# Patient Record
Sex: MALE | Race: OTHER | NOT HISPANIC OR LATINO | URBAN - METROPOLITAN AREA
[De-identification: names, ages, dates, MRNs, and addresses within clinical notes are randomized per-mention and may not be internally consistent; named-entity substitution may affect disease eponyms.]

---

## 2021-10-29 ENCOUNTER — EMERGENCY (EMERGENCY)
Facility: HOSPITAL | Age: 66
LOS: 1 days | Discharge: HOME | End: 2021-10-29
Attending: EMERGENCY MEDICINE | Admitting: EMERGENCY MEDICINE
Payer: COMMERCIAL

## 2021-10-29 VITALS
TEMPERATURE: 98 F | DIASTOLIC BLOOD PRESSURE: 90 MMHG | OXYGEN SATURATION: 98 % | SYSTOLIC BLOOD PRESSURE: 174 MMHG | WEIGHT: 177.03 LBS | HEART RATE: 71 BPM | HEIGHT: 66 IN | RESPIRATION RATE: 18 BRPM

## 2021-10-29 PROCEDURE — 93010 ELECTROCARDIOGRAM REPORT: CPT

## 2021-10-29 PROCEDURE — 99285 EMERGENCY DEPT VISIT HI MDM: CPT

## 2021-10-29 NOTE — ED ADULT TRIAGE NOTE - DOMESTIC TRAVEL HIGH RISK QUESTION
Component Results     Component Value Ref Range & Units Status Collected Lab   pH, Arterial 7.370  7.350 - 7.450 Final 07/05/2018 11:51 PM [address] [place] [address] Lab   pCO2, Arterial 32.0   35.0 - 45.0 mmHg Final 07/05/2018 11:51 PM [facility] Lab   pO2, Arterial 44.0   80.0 - 100.0 mmHg Final 07/05/2018 11:51 PM [facility] Lab   HCO3, Arterial 18.5   22.0 - 26.0 mmol/L Final 07/05/2018 11:51 PM [facility] Excess, Arterial -6.1   -2.0 - 2.0 mmol/L Final 07/05/2018 11:51 PM [address] [place] [address] Lab   Hemoglobin, Art, Extended 8.5   12.0 - 16.0 g/dL Final 07/05/2018 11:51 PM [address] [place] [address] Lab   O2 Sat, Arterial 86.6   >92 % Final 07/05/2018 11:51 PM [facility] Lab   Carboxyhgb, Arterial 2.2  0.0 - 5.0 % Final 07/05/2018 11:51 PM [facility] Lab        0.0-1.5   (Smokers 1.5-5.0)    Methemoglobin, Arterial 1.4  <1.5 % Final 07/05/2018 11:51 PM [address] [place] [address] Lab   O2 Content, Arterial 10.4  Not Established mL/dL Final 07/05/2018 11:51 PM [address] [place] [address] Lab   O2 Therapy          Prvc,18,500,100,10peep  rei No

## 2021-10-30 VITALS
HEART RATE: 59 BPM | OXYGEN SATURATION: 99 % | TEMPERATURE: 98 F | RESPIRATION RATE: 16 BRPM | DIASTOLIC BLOOD PRESSURE: 90 MMHG | SYSTOLIC BLOOD PRESSURE: 174 MMHG

## 2021-10-30 LAB
ALBUMIN SERPL ELPH-MCNC: 4.4 G/DL — SIGNIFICANT CHANGE UP (ref 3.3–5)
ALP SERPL-CCNC: 72 U/L — SIGNIFICANT CHANGE UP (ref 40–120)
ALT FLD-CCNC: 27 U/L — SIGNIFICANT CHANGE UP (ref 10–45)
ANION GAP SERPL CALC-SCNC: 13 MMOL/L — SIGNIFICANT CHANGE UP (ref 5–17)
APPEARANCE UR: CLEAR — SIGNIFICANT CHANGE UP
AST SERPL-CCNC: 32 U/L — SIGNIFICANT CHANGE UP (ref 10–40)
BASE EXCESS BLDV CALC-SCNC: 1.2 MMOL/L — SIGNIFICANT CHANGE UP (ref -2–2)
BASOPHILS # BLD AUTO: 0.06 K/UL — SIGNIFICANT CHANGE UP (ref 0–0.2)
BASOPHILS NFR BLD AUTO: 0.8 % — SIGNIFICANT CHANGE UP (ref 0–2)
BILIRUB SERPL-MCNC: 0.5 MG/DL — SIGNIFICANT CHANGE UP (ref 0.2–1.2)
BILIRUB UR-MCNC: NEGATIVE — SIGNIFICANT CHANGE UP
BUN SERPL-MCNC: 16 MG/DL — SIGNIFICANT CHANGE UP (ref 7–23)
CA-I SERPL-SCNC: 1.26 MMOL/L — SIGNIFICANT CHANGE UP (ref 1.15–1.33)
CALCIUM SERPL-MCNC: 9.6 MG/DL — SIGNIFICANT CHANGE UP (ref 8.4–10.5)
CHLORIDE BLDV-SCNC: 100 MMOL/L — SIGNIFICANT CHANGE UP (ref 96–108)
CHLORIDE SERPL-SCNC: 99 MMOL/L — SIGNIFICANT CHANGE UP (ref 96–108)
CO2 BLDV-SCNC: 28 MMOL/L — HIGH (ref 22–26)
CO2 SERPL-SCNC: 23 MMOL/L — SIGNIFICANT CHANGE UP (ref 22–31)
COLOR SPEC: COLORLESS — SIGNIFICANT CHANGE UP
CREAT SERPL-MCNC: 0.72 MG/DL — SIGNIFICANT CHANGE UP (ref 0.5–1.3)
DIFF PNL FLD: NEGATIVE — SIGNIFICANT CHANGE UP
EOSINOPHIL # BLD AUTO: 0.54 K/UL — HIGH (ref 0–0.5)
EOSINOPHIL NFR BLD AUTO: 7.1 % — HIGH (ref 0–6)
GAS PNL BLDV: 132 MMOL/L — LOW (ref 136–145)
GAS PNL BLDV: SIGNIFICANT CHANGE UP
GAS PNL BLDV: SIGNIFICANT CHANGE UP
GLUCOSE BLDV-MCNC: 103 MG/DL — HIGH (ref 70–99)
GLUCOSE SERPL-MCNC: 110 MG/DL — HIGH (ref 70–99)
GLUCOSE UR QL: NEGATIVE — SIGNIFICANT CHANGE UP
HCO3 BLDV-SCNC: 27 MMOL/L — SIGNIFICANT CHANGE UP (ref 22–29)
HCT VFR BLD CALC: 44.9 % — SIGNIFICANT CHANGE UP (ref 39–50)
HCT VFR BLDA CALC: 48 % — SIGNIFICANT CHANGE UP (ref 39–51)
HGB BLD CALC-MCNC: 15.9 G/DL — SIGNIFICANT CHANGE UP (ref 12.6–17.4)
HGB BLD-MCNC: 15.8 G/DL — SIGNIFICANT CHANGE UP (ref 13–17)
IMM GRANULOCYTES NFR BLD AUTO: 0.4 % — SIGNIFICANT CHANGE UP (ref 0–1.5)
KETONES UR-MCNC: NEGATIVE — SIGNIFICANT CHANGE UP
LACTATE BLDV-MCNC: 0.8 MMOL/L — SIGNIFICANT CHANGE UP (ref 0.7–2)
LEUKOCYTE ESTERASE UR-ACNC: NEGATIVE — SIGNIFICANT CHANGE UP
LIDOCAIN IGE QN: 65 U/L — HIGH (ref 7–60)
LYMPHOCYTES # BLD AUTO: 1.97 K/UL — SIGNIFICANT CHANGE UP (ref 1–3.3)
LYMPHOCYTES # BLD AUTO: 25.9 % — SIGNIFICANT CHANGE UP (ref 13–44)
MAGNESIUM SERPL-MCNC: 2.1 MG/DL — SIGNIFICANT CHANGE UP (ref 1.6–2.6)
MCHC RBC-ENTMCNC: 29.8 PG — SIGNIFICANT CHANGE UP (ref 27–34)
MCHC RBC-ENTMCNC: 35.2 GM/DL — SIGNIFICANT CHANGE UP (ref 32–36)
MCV RBC AUTO: 84.7 FL — SIGNIFICANT CHANGE UP (ref 80–100)
MONOCYTES # BLD AUTO: 0.6 K/UL — SIGNIFICANT CHANGE UP (ref 0–0.9)
MONOCYTES NFR BLD AUTO: 7.9 % — SIGNIFICANT CHANGE UP (ref 2–14)
NEUTROPHILS # BLD AUTO: 4.41 K/UL — SIGNIFICANT CHANGE UP (ref 1.8–7.4)
NEUTROPHILS NFR BLD AUTO: 57.9 % — SIGNIFICANT CHANGE UP (ref 43–77)
NITRITE UR-MCNC: NEGATIVE — SIGNIFICANT CHANGE UP
NRBC # BLD: 0 /100 WBCS — SIGNIFICANT CHANGE UP (ref 0–0)
PCO2 BLDV: 44 MMHG — SIGNIFICANT CHANGE UP (ref 42–55)
PH BLDV: 7.39 — SIGNIFICANT CHANGE UP (ref 7.32–7.43)
PH UR: 6 — SIGNIFICANT CHANGE UP (ref 5–8)
PLATELET # BLD AUTO: 240 K/UL — SIGNIFICANT CHANGE UP (ref 150–400)
PO2 BLDV: 68 MMHG — HIGH (ref 25–45)
POTASSIUM BLDV-SCNC: 3.3 MMOL/L — LOW (ref 3.5–5.1)
POTASSIUM SERPL-MCNC: 3.2 MMOL/L — LOW (ref 3.5–5.3)
POTASSIUM SERPL-SCNC: 3.2 MMOL/L — LOW (ref 3.5–5.3)
PROT SERPL-MCNC: 7.2 G/DL — SIGNIFICANT CHANGE UP (ref 6–8.3)
PROT UR-MCNC: NEGATIVE — SIGNIFICANT CHANGE UP
RAPID RVP RESULT: SIGNIFICANT CHANGE UP
RBC # BLD: 5.3 M/UL — SIGNIFICANT CHANGE UP (ref 4.2–5.8)
RBC # FLD: 12.5 % — SIGNIFICANT CHANGE UP (ref 10.3–14.5)
SAO2 % BLDV: 93.8 % — HIGH (ref 67–88)
SARS-COV-2 RNA SPEC QL NAA+PROBE: SIGNIFICANT CHANGE UP
SODIUM SERPL-SCNC: 135 MMOL/L — SIGNIFICANT CHANGE UP (ref 135–145)
SP GR SPEC: 1.01 — LOW (ref 1.01–1.02)
TROPONIN T, HIGH SENSITIVITY RESULT: 17 NG/L — SIGNIFICANT CHANGE UP (ref 0–51)
TROPONIN T, HIGH SENSITIVITY RESULT: 18 NG/L — SIGNIFICANT CHANGE UP (ref 0–51)
UROBILINOGEN FLD QL: NEGATIVE — SIGNIFICANT CHANGE UP
WBC # BLD: 7.61 K/UL — SIGNIFICANT CHANGE UP (ref 3.8–10.5)
WBC # FLD AUTO: 7.61 K/UL — SIGNIFICANT CHANGE UP (ref 3.8–10.5)

## 2021-10-30 PROCEDURE — 84295 ASSAY OF SERUM SODIUM: CPT

## 2021-10-30 PROCEDURE — 71046 X-RAY EXAM CHEST 2 VIEWS: CPT

## 2021-10-30 PROCEDURE — 85025 COMPLETE CBC W/AUTO DIFF WBC: CPT

## 2021-10-30 PROCEDURE — 84132 ASSAY OF SERUM POTASSIUM: CPT

## 2021-10-30 PROCEDURE — 99284 EMERGENCY DEPT VISIT MOD MDM: CPT | Mod: 25

## 2021-10-30 PROCEDURE — 93005 ELECTROCARDIOGRAM TRACING: CPT

## 2021-10-30 PROCEDURE — 85018 HEMOGLOBIN: CPT

## 2021-10-30 PROCEDURE — 82565 ASSAY OF CREATININE: CPT

## 2021-10-30 PROCEDURE — 85014 HEMATOCRIT: CPT

## 2021-10-30 PROCEDURE — 87086 URINE CULTURE/COLONY COUNT: CPT

## 2021-10-30 PROCEDURE — 83690 ASSAY OF LIPASE: CPT

## 2021-10-30 PROCEDURE — 71046 X-RAY EXAM CHEST 2 VIEWS: CPT | Mod: 26

## 2021-10-30 PROCEDURE — 80053 COMPREHEN METABOLIC PANEL: CPT

## 2021-10-30 PROCEDURE — 73502 X-RAY EXAM HIP UNI 2-3 VIEWS: CPT

## 2021-10-30 PROCEDURE — 81003 URINALYSIS AUTO W/O SCOPE: CPT

## 2021-10-30 PROCEDURE — 84484 ASSAY OF TROPONIN QUANT: CPT

## 2021-10-30 PROCEDURE — 82947 ASSAY GLUCOSE BLOOD QUANT: CPT

## 2021-10-30 PROCEDURE — 73502 X-RAY EXAM HIP UNI 2-3 VIEWS: CPT | Mod: 26,RT

## 2021-10-30 PROCEDURE — 83605 ASSAY OF LACTIC ACID: CPT

## 2021-10-30 PROCEDURE — 83735 ASSAY OF MAGNESIUM: CPT

## 2021-10-30 PROCEDURE — 82330 ASSAY OF CALCIUM: CPT

## 2021-10-30 PROCEDURE — 0225U NFCT DS DNA&RNA 21 SARSCOV2: CPT

## 2021-10-30 PROCEDURE — 36415 COLL VENOUS BLD VENIPUNCTURE: CPT

## 2021-10-30 PROCEDURE — 82803 BLOOD GASES ANY COMBINATION: CPT

## 2021-10-30 PROCEDURE — 82435 ASSAY OF BLOOD CHLORIDE: CPT

## 2021-10-30 PROCEDURE — 96374 THER/PROPH/DIAG INJ IV PUSH: CPT

## 2021-10-30 RX ORDER — FAMOTIDINE 10 MG/ML
20 INJECTION INTRAVENOUS ONCE
Refills: 0 | Status: COMPLETED | OUTPATIENT
Start: 2021-10-30 | End: 2021-10-30

## 2021-10-30 RX ORDER — POTASSIUM CHLORIDE 20 MEQ
40 PACKET (EA) ORAL ONCE
Refills: 0 | Status: COMPLETED | OUTPATIENT
Start: 2021-10-30 | End: 2021-10-30

## 2021-10-30 RX ORDER — FAMOTIDINE 10 MG/ML
1 INJECTION INTRAVENOUS
Qty: 14 | Refills: 0
Start: 2021-10-30 | End: 2021-11-12

## 2021-10-30 RX ADMIN — Medication 40 MILLIEQUIVALENT(S): at 04:50

## 2021-10-30 RX ADMIN — FAMOTIDINE 20 MILLIGRAM(S): 10 INJECTION INTRAVENOUS at 02:51

## 2021-10-30 NOTE — ED PROVIDER NOTE - ATTENDING CONTRIBUTION TO CARE
Attending Statement (JOY Espino MD):    HPI: 65y/o M c/o left sided chest pain for approximately 2 hours prior to arrival to ED.  States that this occurred after eating; indicates the left upper abdomen as site of pain. Denies shortness of breath, denies radiation of pain, no fever, no cough, no other sites of pain (denies abdominal pain), no nausea/vomiting. No diarrhea, no black/blood stools.    Review of Systems:  -General: no fever or chills  -ENT: no congestion, no difficulty swallowing  -Pulmonary: no cough, no shortness of breath  -Cardiac: +chest pain (see hpi), no palpitations  -Gastrointestinal: no abdominal pain, no nausea, no vomiting, and no diarrhea.  -Genitourinary: no blood or pain with urination  -Musculoskeletal: no back or neck pain  -Skin: no rashes  -Endocrine: No h/o diabetes   -Neurologic: No new weakness or numbness in extremities    All else negative unless otherwise specified elsewhere in this note.    PSH/PMH as noted above    On Physical Exam:  General: well appearing, in NAD, speaking clearly in full sentences and without difficulty; cooperative with exam  HEENT: anicteric, airway patent  Neck: no JVD  Cardiac: normal s1, s2; RRR; no MGR  Lungs: CTABL  Abdomen: soft nontender/nondistended  : no bladder tenderness or distension  Skin: intact, no rash  Extremities: no peripheral edema, no gross deformities    ECG NSR @ 65bpm, diffuse flattening in the lateral leads (v5-6 and I, II, II, aVR, aVL, aVF); no prior for comparison; qtc 418    MDM: Episode of chest pain (but localizing to LUQ and in setting / relation to food) suggestive more of gastritis/GI etiology than cardiac. ECG with nonspecific ST abnormalities but no evidence of acute infarct.  Patient's description of chest pain, including lack of pleuritic nature, minimal risk factors and overall clinical picture are not consistent with a pulmonary embolism. The patient's clinical presentation, including type/description of pain, stable vitals and overall clinical picture are not consistent with an acute aortic dissection. No fever, cough or abdominal pain/tenderness to suggest acute infectious etiology.  Will check labs: cbc (to evaluate for leukocytosis or anemia), CMP (to evaluate for electrolyte abnormalities or renal/liver dysfunction), lipase (to evaluate for pancreatitis), and troponin (eval for NSTEMI). Screening CXR ordered.  If no acute actionable findings on labs/imaging, can consider treatment as GI source, but also recommend close interval cardiology follow-up to further exclude CAD.      Please see above progress notes above for updates to medical decision making and the patient's clinical course.

## 2021-10-30 NOTE — ED PROVIDER NOTE - OBJECTIVE STATEMENT
67 yo hx of HLD, HTN, presenting for c/o LUQ abd pain which began after eating lunch at 2 pm today. Described as cramping and non radi ting. He has not had this pain before. In ED pt states that his pain has improved significantly. States his pain improved after BM at 6 PM. Denies CP, SOB, nausea, vomiting, changes in BM's, melena, diarrhea, changes in urination, and swelling in legs. Denies lightheadedness and palpitations. Denies fevers, cough, and congestion. Took tylenol earlier today for his symptoms. Denies hx of CAD, smoking, regular alcohol, or significant family hx of cardiac disease. Unrelated to chief complaint, pt reports 2 wks atraumatic right thigh/hip pain. Denies LE numbness or weakness. Denies recent trauma.

## 2021-10-30 NOTE — ED PROVIDER NOTE - PHYSICAL EXAMINATION
Gen: A&Ox4   HEENT: Atraumatic. Mucous membranes moist, no scleral icterus.  CV: RRR. No significant lower extremity edema.   Resp: Respirations unlabored. CTAB, no rales, no wheezes.  GI: Abdomen minimally ttp in LUQ, otherwise non tender to palpation, soft and non-distended. Also ttp over proximal right anterior thigh w/o overlying skin changes, no restriction in active ROM.   Skin/MSK: No open wounds. No ecchymosis appreciated.  Neuro: Following commands. No facial drop. Motor strength +5/5 throughout upper and lower extremities. Sensation intact throughout upper and lower extremities.   Psych: Appropriate mood, cooperative

## 2021-10-30 NOTE — ED PROVIDER NOTE - NS ED ROS FT
Gen: Denies fever.   HEENT: Denies headache. Denies congestion.  CV: Denies chest pain. Denies lightheadedness.  Skin: Denies rash.   Resp: Denies SOB. Denies cough.  GI: +abd pain. Denies nausea. Denies vomiting. Denies diarrhea. Denies melena. Denies hematochezia.  Msk: Denies extremity swelling. +extremity pain.  : Denies dysuria. Denies hematuria.  Neuro: Denies LOC. Denies dizziness. Denies new numbness/tingling. Denies new focal weakness.  Psych: Denies SI

## 2021-10-30 NOTE — ED ADULT NURSE REASSESSMENT NOTE - NS ED NURSE REASSESS COMMENT FT1
Pt resting in stretcher, a&ox3, nad, breathing spontaneous and nonlabored, able to move all extremities and follow commands. Pt denies cp at this time, denies additional pain medication at this time. Pending repeat troponin. Pt sinus tatiana on CM

## 2021-10-30 NOTE — ED PROVIDER NOTE - NSPTACCESSSVCSAPPTDETAILS_ED_ALL_ED_FT
Please call to arrange follow up with cardiology within 3-5 days after visit in ED Please call to arrange follow up with cardiology within 3-5 days after visit in ED. Please call son, Leif Wynn, at [phone] to arrange this appointment.

## 2021-10-30 NOTE — ED PROVIDER NOTE - NSFOLLOWUPINSTRUCTIONS_ED_ALL_ED_FT
1. You presented to the emergency department for:  abdominal pain    2. Your evaluation in the emergency department included a physician evaluation and testing consisting of: lab work, ecg, and xrays. Your work-up did not reveal any findings indicating the need for admission to the hospital or any emergent interventions at this time.     3. It is recommended that you follow-up with a cardiologist within 3-5 days and your primary care doctor within 1-2 weeks as discussed for repeat evaluations, and potentially further testing and treatment.     If needed, to arrange an appointment with a primary care provider please call: [phone]    4. Please continue taking your regular medications as prescribed.     In the event that your abdominal pain is due to a peptic ulcer or gastritis, a prescription for famotadine is available for you to  at your pharmacy to trial until you follow up with your doctor. Please read and adhere to the instructions for use available on the packaging. Additionally, please read the warnings on the packaging before use. If you have any questions regarding your prescription, you may refer them to the pharmacist.    For pain you may take 500-1000mg Tylenol every 8 hours - as needed.  This is an over-the-counter medication - please read the instructions for use and warnings on the label. If you have any questions regarding its use, you may refer them to your local pharmacist.    5. PLEASE RETURN TO THE EMERGENCY DEPARTMENT IMMEDIATELY IF you develop any fevers not responding to over the counter medications, uncontrollable nausea and vomiting, an inability to tolerate eating and drinking, difficulty breathing, chest pain, a severe increase in your symptoms or pain, or any other new symptoms that concern you. 1. You presented to the emergency department for:  abdominal pain    2. Your evaluation in the emergency department included a physician evaluation and testing consisting of: lab work, ecg, and xrays. Your work-up did not reveal any findings indicating the need for admission to the hospital or any emergent interventions at this time.     3. It is recommended that you follow-up with a cardiologist within 3-5 days, and your primary care doctor within 1-2 weeks, as discussed for repeat evaluations, and potentially further testing and treatment.     If needed, to arrange an appointment with a primary care provider please call: [phone]    4. Please continue taking your regular medications as prescribed.     In the event that your abdominal pain is due to a peptic ulcer or gastritis, a prescription for famotidine is available for you to  at your pharmacy to trial until you follow up with your doctor. Please read and adhere to the instructions for use available on the packaging. Additionally, please read the warnings on the packaging before use. If you have any questions regarding your prescription, you may refer them to the pharmacist.    For pain you may take 500-1000mg Tylenol every 8 hours - as needed.  This is an over-the-counter medication - please read the instructions for use and warnings on the label. If you have any questions regarding its use, you may refer them to your local pharmacist.    5. PLEASE RETURN TO THE EMERGENCY DEPARTMENT IMMEDIATELY IF you develop any fevers not responding to over the counter medications, uncontrollable nausea and vomiting, an inability to tolerate eating and drinking, difficulty breathing, chest pain, a severe increase in your symptoms or pain, or any other new symptoms that concern you.

## 2021-10-30 NOTE — ED PROVIDER NOTE - NSFOLLOWUPCLINICS_GEN_ALL_ED_FT
[facility]  Cardiology  [address] [place] [id number]  Phone: [phone]  Fax:

## 2021-10-30 NOTE — ED ADULT NURSE NOTE - OBJECTIVE STATEMENT
65 yo male presents to ED c/o L sided CP x 2 hours PTA. Pt reports the pain started after eating. Pt denies sob, palpitations, radiating pain, h/a, dizziness, weakness, numbness/tingling, abdominal pain, n/v/d, urinary symptoms, fevers, chills, hematuria, blood in stool. Upon assessment, pt a&ox3, nad, breathing spontaneous and nonlabored, able to move all extremities and follow commands, skin warm and appropriate color. Pt sinus tatiana on CM. MD at bedside. Pt safety and comfort provided. 65 yo male with pmh HTN presents to ED c/o L sided CP x 2 hours PTA. Pt reports the pain started after eating. Pt denies sob, palpitations, radiating pain, h/a, dizziness, weakness, numbness/tingling, abdominal pain, n/v/d, urinary symptoms, fevers, chills, hematuria, blood in stool. Upon assessment, pt a&ox3, nad, breathing spontaneous and nonlabored, able to move all extremities and follow commands, skin warm and appropriate color. Pt sinus tatiana on CM. MD at bedside. Pt safety and comfort provided. 65 yo male with pmh HTN presents to ED c/o L sided CP x 2 hours PTA. Pt reports the pain started after eating "kyle and rice and beans." Pt denies sob, palpitations, radiating pain, h/a, dizziness, weakness, numbness/tingling, abdominal pain, n/v/d, urinary symptoms, fevers, chills, hematuria, blood in stool. Upon assessment, pt a&ox3, nad, breathing spontaneous and nonlabored, able to move all extremities and follow commands, skin warm and appropriate color. Pt sinus tatiana on CM. MD at bedside. Pt safety and comfort provided.

## 2021-10-30 NOTE — ED PROVIDER NOTE - ST/T WAVE
diffuse flattening in the lateral leads (v5-6 and I, II, II, aVR, aVL, aVF); no prior for comparison; qtc 418

## 2021-10-30 NOTE — ED PROVIDER NOTE - CLINICAL SUMMARY MEDICAL DECISION MAKING FREE TEXT BOX
67 yo hx of HLD, HTN, presenting for c/o LUQ abd pain which began after eating lunch at 2 pm today. No infectious symptoms. No CP/SOB or melena. Cardiac risk factors HLD, HTN, age. Improvement in pain after BM prior to ED arrival. Minimal concern for cardiac ischemia but will trop r/o given chief complaint from triage. Likely 2/2 to constipation given hx, low concern for diverticulitis, pancreatitis. Will assess lab work, ecg, cxr. Famotidine. XR for right hip pain. Will reassess.

## 2021-10-30 NOTE — ED PROVIDER NOTE - PATIENT PORTAL LINK FT
You can access the FollowMyHealth Patient Portal offered by [facility] by registering at the following website: [email or web address]. By joining [employer]’s FollowMyHealth portal, you will also be able to view your health information using other applications (apps) compatible with our system.

## 2021-10-30 NOTE — ED PROVIDER NOTE - PROGRESS NOTE DETAILS
Arron Valenzuela PGY1: Results thus far reviewed, and there are no findings suggesting need for hospitalization or further emergent treatment. Repeat trop stable. Results discussed with pt. Pt states that their symptoms have improved, and they are comfortable with returning home with cardiology follow-up. Also instructed to f/u with internal medicine for ongoing evaluation of LUQ pain. Pt understands plan, and has been provided with return precautions, and understands reasons to return to the ER. Son at bedside and in agreement as well.

## 2021-10-31 LAB
CULTURE RESULTS: NO GROWTH — SIGNIFICANT CHANGE UP
SPECIMEN SOURCE: SIGNIFICANT CHANGE UP

## 2021-11-03 DIAGNOSIS — R10.9 UNSPECIFIED ABDOMINAL PAIN: ICD-10-CM

## 2021-11-03 DIAGNOSIS — I10 ESSENTIAL (PRIMARY) HYPERTENSION: ICD-10-CM

## 2021-11-04 ENCOUNTER — APPOINTMENT (OUTPATIENT)
Dept: CARDIOLOGY | Facility: CLINIC | Age: 66
End: 2021-11-04

## 2022-06-30 ENCOUNTER — INPATIENT (INPATIENT)
Facility: HOSPITAL | Age: 67
LOS: 1 days | Discharge: HOME | DRG: 370 | End: 2022-07-02
Attending: STUDENT IN AN ORGANIZED HEALTH CARE EDUCATION/TRAINING PROGRAM | Admitting: STUDENT IN AN ORGANIZED HEALTH CARE EDUCATION/TRAINING PROGRAM
Payer: COMMERCIAL

## 2022-06-30 VITALS
HEIGHT: 66 IN | OXYGEN SATURATION: 96 % | HEART RATE: 74 BPM | SYSTOLIC BLOOD PRESSURE: 152 MMHG | TEMPERATURE: 98 F | WEIGHT: 169.98 LBS | DIASTOLIC BLOOD PRESSURE: 82 MMHG | RESPIRATION RATE: 18 BRPM

## 2022-06-30 DIAGNOSIS — R07.9 CHEST PAIN, UNSPECIFIED: ICD-10-CM

## 2022-06-30 DIAGNOSIS — Z29.9 ENCOUNTER FOR PROPHYLACTIC MEASURES, UNSPECIFIED: ICD-10-CM

## 2022-06-30 DIAGNOSIS — K92.2 GASTROINTESTINAL HEMORRHAGE, UNSPECIFIED: ICD-10-CM

## 2022-06-30 DIAGNOSIS — I10 ESSENTIAL (PRIMARY) HYPERTENSION: ICD-10-CM

## 2022-06-30 LAB
ALBUMIN SERPL ELPH-MCNC: 4.3 G/DL — SIGNIFICANT CHANGE UP (ref 3.3–5)
ALP SERPL-CCNC: 62 U/L — SIGNIFICANT CHANGE UP (ref 40–120)
ALT FLD-CCNC: 21 U/L — SIGNIFICANT CHANGE UP (ref 10–45)
ANION GAP SERPL CALC-SCNC: 11 MMOL/L — SIGNIFICANT CHANGE UP (ref 5–17)
APTT BLD: 32.2 SEC — SIGNIFICANT CHANGE UP (ref 27.5–35.5)
AST SERPL-CCNC: 27 U/L — SIGNIFICANT CHANGE UP (ref 10–40)
BASE EXCESS BLDV CALC-SCNC: 3.4 MMOL/L — HIGH (ref -2–2)
BASOPHILS # BLD AUTO: 0.05 K/UL — SIGNIFICANT CHANGE UP (ref 0–0.2)
BASOPHILS NFR BLD AUTO: 0.6 % — SIGNIFICANT CHANGE UP (ref 0–2)
BILIRUB SERPL-MCNC: 0.4 MG/DL — SIGNIFICANT CHANGE UP (ref 0.2–1.2)
BLD GP AB SCN SERPL QL: NEGATIVE — SIGNIFICANT CHANGE UP
BUN SERPL-MCNC: 17 MG/DL — SIGNIFICANT CHANGE UP (ref 7–23)
CA-I SERPL-SCNC: 1.25 MMOL/L — SIGNIFICANT CHANGE UP (ref 1.15–1.33)
CALCIUM SERPL-MCNC: 9.5 MG/DL — SIGNIFICANT CHANGE UP (ref 8.4–10.5)
CHLORIDE BLDV-SCNC: 101 MMOL/L — SIGNIFICANT CHANGE UP (ref 96–108)
CHLORIDE SERPL-SCNC: 101 MMOL/L — SIGNIFICANT CHANGE UP (ref 96–108)
CO2 BLDV-SCNC: 30 MMOL/L — HIGH (ref 22–26)
CO2 SERPL-SCNC: 24 MMOL/L — SIGNIFICANT CHANGE UP (ref 22–31)
CREAT SERPL-MCNC: 0.94 MG/DL — SIGNIFICANT CHANGE UP (ref 0.5–1.3)
EGFR: 89 ML/MIN/1.73M2 — SIGNIFICANT CHANGE UP
EOSINOPHIL # BLD AUTO: 0.57 K/UL — HIGH (ref 0–0.5)
EOSINOPHIL NFR BLD AUTO: 7.1 % — HIGH (ref 0–6)
GAS PNL BLDV: 134 MMOL/L — LOW (ref 136–145)
GAS PNL BLDV: SIGNIFICANT CHANGE UP
GAS PNL BLDV: SIGNIFICANT CHANGE UP
GLUCOSE BLDV-MCNC: 92 MG/DL — SIGNIFICANT CHANGE UP (ref 70–99)
GLUCOSE SERPL-MCNC: 98 MG/DL — SIGNIFICANT CHANGE UP (ref 70–99)
HCO3 BLDV-SCNC: 29 MMOL/L — SIGNIFICANT CHANGE UP (ref 22–29)
HCT VFR BLD CALC: 44.2 % — SIGNIFICANT CHANGE UP (ref 39–50)
HCT VFR BLDA CALC: 45 % — SIGNIFICANT CHANGE UP (ref 39–51)
HGB BLD CALC-MCNC: 15 G/DL — SIGNIFICANT CHANGE UP (ref 12.6–17.4)
HGB BLD-MCNC: 14.8 G/DL — SIGNIFICANT CHANGE UP (ref 13–17)
IMM GRANULOCYTES NFR BLD AUTO: 0.3 % — SIGNIFICANT CHANGE UP (ref 0–1.5)
INR BLD: 1.14 RATIO — SIGNIFICANT CHANGE UP (ref 0.88–1.16)
LACTATE BLDV-MCNC: 0.7 MMOL/L — SIGNIFICANT CHANGE UP (ref 0.7–2)
LIDOCAIN IGE QN: 29 U/L — SIGNIFICANT CHANGE UP (ref 7–60)
LYMPHOCYTES # BLD AUTO: 1.78 K/UL — SIGNIFICANT CHANGE UP (ref 1–3.3)
LYMPHOCYTES # BLD AUTO: 22.3 % — SIGNIFICANT CHANGE UP (ref 13–44)
MCHC RBC-ENTMCNC: 29.2 PG — SIGNIFICANT CHANGE UP (ref 27–34)
MCHC RBC-ENTMCNC: 33.5 GM/DL — SIGNIFICANT CHANGE UP (ref 32–36)
MCV RBC AUTO: 87.4 FL — SIGNIFICANT CHANGE UP (ref 80–100)
MONOCYTES # BLD AUTO: 0.76 K/UL — SIGNIFICANT CHANGE UP (ref 0–0.9)
MONOCYTES NFR BLD AUTO: 9.5 % — SIGNIFICANT CHANGE UP (ref 2–14)
NEUTROPHILS # BLD AUTO: 4.82 K/UL — SIGNIFICANT CHANGE UP (ref 1.8–7.4)
NEUTROPHILS NFR BLD AUTO: 60.2 % — SIGNIFICANT CHANGE UP (ref 43–77)
NRBC # BLD: 0 /100 WBCS — SIGNIFICANT CHANGE UP (ref 0–0)
OB PNL STL: NEGATIVE — SIGNIFICANT CHANGE UP
PCO2 BLDV: 47 MMHG — SIGNIFICANT CHANGE UP (ref 42–55)
PH BLDV: 7.4 — SIGNIFICANT CHANGE UP (ref 7.32–7.43)
PLATELET # BLD AUTO: 229 K/UL — SIGNIFICANT CHANGE UP (ref 150–400)
PO2 BLDV: 47 MMHG — HIGH (ref 25–45)
POTASSIUM BLDV-SCNC: 3.5 MMOL/L — SIGNIFICANT CHANGE UP (ref 3.5–5.1)
POTASSIUM SERPL-MCNC: 3.7 MMOL/L — SIGNIFICANT CHANGE UP (ref 3.5–5.3)
POTASSIUM SERPL-SCNC: 3.7 MMOL/L — SIGNIFICANT CHANGE UP (ref 3.5–5.3)
PROT SERPL-MCNC: 7.4 G/DL — SIGNIFICANT CHANGE UP (ref 6–8.3)
PROTHROM AB SERPL-ACNC: 13.1 SEC — SIGNIFICANT CHANGE UP (ref 10.5–13.4)
RBC # BLD: 5.06 M/UL — SIGNIFICANT CHANGE UP (ref 4.2–5.8)
RBC # FLD: 12.1 % — SIGNIFICANT CHANGE UP (ref 10.3–14.5)
RH IG SCN BLD-IMP: POSITIVE — SIGNIFICANT CHANGE UP
RH IG SCN BLD-IMP: POSITIVE — SIGNIFICANT CHANGE UP
SAO2 % BLDV: 74.5 % — SIGNIFICANT CHANGE UP (ref 67–88)
SARS-COV-2 RNA SPEC QL NAA+PROBE: SIGNIFICANT CHANGE UP
SODIUM SERPL-SCNC: 136 MMOL/L — SIGNIFICANT CHANGE UP (ref 135–145)
TROPONIN T, HIGH SENSITIVITY RESULT: 15 NG/L — SIGNIFICANT CHANGE UP (ref 0–51)
TROPONIN T, HIGH SENSITIVITY RESULT: 18 NG/L — SIGNIFICANT CHANGE UP (ref 0–51)
WBC # BLD: 8 K/UL — SIGNIFICANT CHANGE UP (ref 3.8–10.5)
WBC # FLD AUTO: 8 K/UL — SIGNIFICANT CHANGE UP (ref 3.8–10.5)

## 2022-06-30 PROCEDURE — 71046 X-RAY EXAM CHEST 2 VIEWS: CPT | Mod: 26

## 2022-06-30 PROCEDURE — 93010 ELECTROCARDIOGRAM REPORT: CPT

## 2022-06-30 PROCEDURE — 99285 EMERGENCY DEPT VISIT HI MDM: CPT

## 2022-06-30 PROCEDURE — 99223 1ST HOSP IP/OBS HIGH 75: CPT

## 2022-06-30 RX ORDER — LOSARTAN POTASSIUM 100 MG/1
1 TABLET, FILM COATED ORAL
Qty: 0 | Refills: 0 | DISCHARGE

## 2022-06-30 RX ORDER — ASPIRIN/CALCIUM CARB/MAGNESIUM 324 MG
1 TABLET ORAL
Qty: 0 | Refills: 0 | DISCHARGE

## 2022-06-30 RX ORDER — PANTOPRAZOLE SODIUM 20 MG/1
8 TABLET, DELAYED RELEASE ORAL
Qty: 80 | Refills: 0 | Status: DISCONTINUED | OUTPATIENT
Start: 2022-06-30 | End: 2022-07-01

## 2022-06-30 RX ORDER — ACETAMINOPHEN 500 MG
975 TABLET ORAL ONCE
Refills: 0 | Status: COMPLETED | OUTPATIENT
Start: 2022-06-30 | End: 2022-06-30

## 2022-06-30 RX ORDER — SIMVASTATIN 20 MG/1
20 TABLET, FILM COATED ORAL AT BEDTIME
Refills: 0 | Status: DISCONTINUED | OUTPATIENT
Start: 2022-06-30 | End: 2022-06-30

## 2022-06-30 RX ORDER — ACETAMINOPHEN 500 MG
650 TABLET ORAL EVERY 6 HOURS
Refills: 0 | Status: DISCONTINUED | OUTPATIENT
Start: 2022-06-30 | End: 2022-07-02

## 2022-06-30 RX ORDER — LOSARTAN POTASSIUM 100 MG/1
50 TABLET, FILM COATED ORAL DAILY
Refills: 0 | Status: DISCONTINUED | OUTPATIENT
Start: 2022-06-30 | End: 2022-07-02

## 2022-06-30 RX ORDER — ATORVASTATIN CALCIUM 80 MG/1
80 TABLET, FILM COATED ORAL AT BEDTIME
Refills: 0 | Status: DISCONTINUED | OUTPATIENT
Start: 2022-06-30 | End: 2022-07-02

## 2022-06-30 RX ORDER — SIMVASTATIN 20 MG/1
1 TABLET, FILM COATED ORAL
Qty: 0 | Refills: 0 | DISCHARGE

## 2022-06-30 RX ADMIN — Medication 975 MILLIGRAM(S): at 21:29

## 2022-06-30 RX ADMIN — PANTOPRAZOLE SODIUM 10 MG/HR: 20 TABLET, DELAYED RELEASE ORAL at 23:45

## 2022-06-30 RX ADMIN — LOSARTAN POTASSIUM 50 MILLIGRAM(S): 100 TABLET, FILM COATED ORAL at 23:45

## 2022-06-30 RX ADMIN — ATORVASTATIN CALCIUM 80 MILLIGRAM(S): 80 TABLET, FILM COATED ORAL at 23:45

## 2022-06-30 NOTE — ED PROVIDER NOTE - CLINICAL SUMMARY MEDICAL DECISION MAKING FREE TEXT BOX
Adult male hx of HTN,HLD, pw UPI bleed and cp, concerns for symptomatic anemia/acs, check labs, ekg trop. Probable admit  Shon Hwang MD, Facep

## 2022-06-30 NOTE — H&P ADULT - PROBLEM SELECTOR PLAN 1
See above.   Clears for now.   Orthostatics.   PPI gtt.  Repeat CBC overnight.   Type and screen.  Will HOLD the ASA.  Would consider formal GI evaluation in the AM. See above.   Clears for now.   Orthostatics.   PPI gtt.  Repeat CBC overnight.   Type and screen.  Will HOLD the ASA.  Would consider formal GI evaluation in the AM>>secure [facility] Email sent to GI by examiner.

## 2022-06-30 NOTE — H&P ADULT - NSHPREVIEWOFSYSTEMS_GEN_ALL_CORE
NO HA, no focal weakness.  NO cough, no wheezing.   + exertional dyspnoea  NO back pain, no tearing back pain.  NO rash.  NO joint pain.    NO weight loss or anorexia.  NO SI/HI.  NO thyroid symptoms.  NO node swelling.  NO visual complaints.

## 2022-06-30 NOTE — ED PROVIDER NOTE - OBJECTIVE STATEMENT
67 year old male with a PMHx of HTN, HLD presenting with chest pain. Patient vomited blood yesterday around 5pm and has had constant left lateral chest pain since. Nonradiating. Denies fevers, abdominal pain, nausea, dyspnea.

## 2022-06-30 NOTE — ED PROVIDER NOTE - ATTENDING CONTRIBUTION TO CARE
Private Physician Oscar/pcp/Adam Sandoval.  67y male pmh HTN, HLD, no habits, dm, cancer,cva,cad,travel, PT comes to ed c/o Nausea w bloody vomitus yesterday evening, x1. Now comes to ed c/o cp, constant, not worse w exertion, pain sharp. Pt seen for cp last  year with neg ed [place] and didn't follow up w cards, PT born [place] x22y. PE WDWN muscular male nad normocephalic atraumatic neck supple chest clear anterior & posterior cv no rubs, gallops or murmurs abd soft +bs no mass guarding. Neruo no focal defects.  Shon Hwang MD, Facep

## 2022-06-30 NOTE — ED ADULT NURSE NOTE - NSIMPLEMENTINTERV_GEN_ALL_ED
Implemented All Universal Safety Interventions:  [place] to call system. Call bell, personal items and telephone within reach. Instruct patient to call for assistance. Room bathroom lighting operational. Non-slip footwear when patient is off stretcher. Physically safe environment: no spills, clutter or unnecessary equipment. Stretcher in lowest position, wheels locked, appropriate side rails in place.

## 2022-06-30 NOTE — H&P ADULT - HISTORY OF PRESENT ILLNESS
NIGHT HOSPITALIST:    Patient UNKNOWN to me previously, assigned to me at this point via the ER to admit this 68 y/o M--patient seen with patient's son in attendance with patient's permission--Gaurav Audio  # [id number]--nugbjgs with a history of essential HTN maintained on an ARB, hyperlipidemia on Zocor, apparently an episode of COVID-19 in Mar 2021 with patient convalescence at home with no treatment (COVID-19 vaccinated x 3), unclear if patient previously on metformin for prediabetes, ASA 81 mg daily, with patient self referring today following apparently an episode yesterday of red blood emesis between two episodes of sharp LEFT sided chest pain.  NO LOC.  NO dizziness.   Patient did not seek medical attention until today, with persistent episodes of LEFT sharp chest pain prompting referral to the ER.   Patient with a [facility] ED evaluation in Nov 2021 for chest pain with apparently a nondiagnostic ED workup and was discharged from the ED at the time.   Patient denies any interval cardiac workup.   Patient denies melena, no red blood per rectum.  Patient does admit to exertional dyspnoea for the past 3 months.  NO fever, no chills, no rigors.   NO chest pain at present.  Denies palliative or exacerbating factors.

## 2022-06-30 NOTE — H&P ADULT - NSHPLABSRESULTS_GEN_ALL_CORE
WBC 8.0     Hgb 14.8    Platelets of 229K    Cr 0.9  Random glucose of 98.    K+ 3.7    HS troponin 15>repeated.    COVID-19 PCR<<negative.    Chest radiograph PA/Lateral reviewed with no infiltrate or effusion.    EKG tracing reviewed with NSR at 69 with Q V1-V3.   (Sept 2021 with Q V1-V2)\      INR 1.1

## 2022-06-30 NOTE — ED PROVIDER NOTE - NSICDXPASTMEDICALHX_GEN_ALL_CORE_FT
PAST MEDICAL HISTORY:  2019 novel coronavirus disease (COVID-19) March 2021    COVID-19 vaccine series completed     HLD (hyperlipidemia)     HTN (hypertension)

## 2022-06-30 NOTE — H&P ADULT - ASSESSMENT
NIGHT HOSPITALIST:   NIGHT HOSPITALIST:    Suspected episode of upper GI bleed from yesterday but with episode of chest pain sandwiched between the interim>>patient with stable present hemodynamics but will follow orthostatics and repeat CBC overnight.         Will have to stop the ASA.   Clears for now.  Will start PPI gtt now and would consider formal GI evaluation in the AM.     Troponin assays nondiagnostic but patient with history of exertional dyspnoea and septal Q waves and will still assume underlying cardiac equivalent.   Echo to assess LV function.  Will provide the equivalent Lipitor at the maximum tolerated dose for now.    Will check HgbA1C in the AM.    GI bleed precludes pharmacologic DVT prophylaxis.     NIGHT HOSPITALIST:    Suspected episode of upper GI bleed from yesterday but with episode of chest pain sandwiched between the interim>>no apparent preexisting coagulopathy, patient with stable present hemodynamics but will follow orthostatics and repeat CBC overnight.         Will have to stop the ASA.   Clears for now.  Will start PPI gtt now and would consider formal GI evaluation in the AM.     Troponin assays nondiagnostic but patient with history of exertional dyspnoea and septal Q waves and will still assume underlying cardiac equivalent.   Echo to assess LV function.  Will provide the equivalent Lipitor at the maximum tolerated dose for now.    Will check HgbA1C in the AM.    GI bleed precludes pharmacologic DVT prophylaxis.

## 2022-06-30 NOTE — ED PROVIDER NOTE - PHYSICAL EXAMINATION
gen: well appearing  Mentation: AAO x 3  psych: mood appropriate  HEENT: airway patent, conjunctivae clear bilaterally  Cardio: RRR, no m/r/g  Resp: normal BS b/l  GI: soft/nondistended/nontender, rectal exam reveals no karen melena or bright red blood  Neuro: sensation and motor function grossly intact  Skin: No evidence of rash  MSK: normal movement of all extremities  Lymph/Vasc: no LE edema

## 2022-06-30 NOTE — H&P ADULT - NSHPADDITIONALINFOADULT_GEN_ALL_CORE
NIGHT HOSPITALIST:   Patient/ son in attendance aware of course and agrees with plan/care as above.    Emotional support provided to patient/son.   Care reviewed with covering NP/PA for endorsement to my physician colleagues.    Cb Gayle MD  Available on [employer] until 7/1/22   0208 NIGHT HOSPITALIST:   Patient/ son in attendance aware of course and agrees with plan/care as above.    Emotional support provided to patient/son.   Care reviewed with covering NP/Karon SINGLETON for endorsement to my physician colleagues.    Cb Gayle MD  Available on [employer] Teams until 7/1/22   0206

## 2022-06-30 NOTE — ED ADULT NURSE NOTE - OBJECTIVE STATEMENT
Pt 67 year old male, A/O x4. Pt came in due to left sided chest pain and one episode of red emesis. PMH- HTN. As per son at bedside, pt started ot have chest pain in evening after drinking water, an done episode of bloody emesis. Pt sates pain is occasional and decreases with activity. Upon assessment, pt ambulate independent, speaking in full coherent sentences, no signs of distress. Skin- warm, dry, intact. Abd. soft, nontender, nondistended. NSR on monitor. Denies SOB, fever, chills, N/V/D, HA, numbness/ tingling, vision changes.

## 2022-07-01 LAB
ANION GAP SERPL CALC-SCNC: 11 MMOL/L — SIGNIFICANT CHANGE UP (ref 5–17)
BASOPHILS # BLD AUTO: 0.04 K/UL — SIGNIFICANT CHANGE UP (ref 0–0.2)
BASOPHILS # BLD AUTO: 0.05 K/UL — SIGNIFICANT CHANGE UP (ref 0–0.2)
BASOPHILS NFR BLD AUTO: 0.6 % — SIGNIFICANT CHANGE UP (ref 0–2)
BASOPHILS NFR BLD AUTO: 0.7 % — SIGNIFICANT CHANGE UP (ref 0–2)
BUN SERPL-MCNC: 16 MG/DL — SIGNIFICANT CHANGE UP (ref 7–23)
CALCIUM SERPL-MCNC: 9.4 MG/DL — SIGNIFICANT CHANGE UP (ref 8.4–10.5)
CHLORIDE SERPL-SCNC: 101 MMOL/L — SIGNIFICANT CHANGE UP (ref 96–108)
CO2 SERPL-SCNC: 24 MMOL/L — SIGNIFICANT CHANGE UP (ref 22–31)
CREAT SERPL-MCNC: 0.87 MG/DL — SIGNIFICANT CHANGE UP (ref 0.5–1.3)
EGFR: 95 ML/MIN/1.73M2 — SIGNIFICANT CHANGE UP
EOSINOPHIL # BLD AUTO: 0.6 K/UL — HIGH (ref 0–0.5)
EOSINOPHIL # BLD AUTO: 0.62 K/UL — HIGH (ref 0–0.5)
EOSINOPHIL NFR BLD AUTO: 8.8 % — HIGH (ref 0–6)
EOSINOPHIL NFR BLD AUTO: 9.6 % — HIGH (ref 0–6)
GLUCOSE BLDC GLUCOMTR-MCNC: 107 MG/DL — HIGH (ref 70–99)
GLUCOSE SERPL-MCNC: 107 MG/DL — HIGH (ref 70–99)
HCT VFR BLD CALC: 44.2 % — SIGNIFICANT CHANGE UP (ref 39–50)
HCT VFR BLD CALC: 45 % — SIGNIFICANT CHANGE UP (ref 39–50)
HCV AB S/CO SERPL IA: 0.18 S/CO — SIGNIFICANT CHANGE UP (ref 0–0.99)
HCV AB SERPL-IMP: SIGNIFICANT CHANGE UP
HGB BLD-MCNC: 15.1 G/DL — SIGNIFICANT CHANGE UP (ref 13–17)
HGB BLD-MCNC: 15.2 G/DL — SIGNIFICANT CHANGE UP (ref 13–17)
IMM GRANULOCYTES NFR BLD AUTO: 0.3 % — SIGNIFICANT CHANGE UP (ref 0–1.5)
IMM GRANULOCYTES NFR BLD AUTO: 0.4 % — SIGNIFICANT CHANGE UP (ref 0–1.5)
LYMPHOCYTES # BLD AUTO: 1.57 K/UL — SIGNIFICANT CHANGE UP (ref 1–3.3)
LYMPHOCYTES # BLD AUTO: 1.83 K/UL — SIGNIFICANT CHANGE UP (ref 1–3.3)
LYMPHOCYTES # BLD AUTO: 24.3 % — SIGNIFICANT CHANGE UP (ref 13–44)
LYMPHOCYTES # BLD AUTO: 26.9 % — SIGNIFICANT CHANGE UP (ref 13–44)
MCHC RBC-ENTMCNC: 29.5 PG — SIGNIFICANT CHANGE UP (ref 27–34)
MCHC RBC-ENTMCNC: 29.8 PG — SIGNIFICANT CHANGE UP (ref 27–34)
MCHC RBC-ENTMCNC: 33.8 GM/DL — SIGNIFICANT CHANGE UP (ref 32–36)
MCHC RBC-ENTMCNC: 34.2 GM/DL — SIGNIFICANT CHANGE UP (ref 32–36)
MCV RBC AUTO: 87.4 FL — SIGNIFICANT CHANGE UP (ref 80–100)
MCV RBC AUTO: 87.4 FL — SIGNIFICANT CHANGE UP (ref 80–100)
MONOCYTES # BLD AUTO: 0.58 K/UL — SIGNIFICANT CHANGE UP (ref 0–0.9)
MONOCYTES # BLD AUTO: 0.68 K/UL — SIGNIFICANT CHANGE UP (ref 0–0.9)
MONOCYTES NFR BLD AUTO: 10 % — SIGNIFICANT CHANGE UP (ref 2–14)
MONOCYTES NFR BLD AUTO: 9 % — SIGNIFICANT CHANGE UP (ref 2–14)
NEUTROPHILS # BLD AUTO: 3.62 K/UL — SIGNIFICANT CHANGE UP (ref 1.8–7.4)
NEUTROPHILS # BLD AUTO: 3.64 K/UL — SIGNIFICANT CHANGE UP (ref 1.8–7.4)
NEUTROPHILS NFR BLD AUTO: 53.2 % — SIGNIFICANT CHANGE UP (ref 43–77)
NEUTROPHILS NFR BLD AUTO: 56.2 % — SIGNIFICANT CHANGE UP (ref 43–77)
NRBC # BLD: 0 /100 WBCS — SIGNIFICANT CHANGE UP (ref 0–0)
NRBC # BLD: 0 /100 WBCS — SIGNIFICANT CHANGE UP (ref 0–0)
PLATELET # BLD AUTO: 230 K/UL — SIGNIFICANT CHANGE UP (ref 150–400)
PLATELET # BLD AUTO: 238 K/UL — SIGNIFICANT CHANGE UP (ref 150–400)
POTASSIUM SERPL-MCNC: 3.6 MMOL/L — SIGNIFICANT CHANGE UP (ref 3.5–5.3)
POTASSIUM SERPL-SCNC: 3.6 MMOL/L — SIGNIFICANT CHANGE UP (ref 3.5–5.3)
RBC # BLD: 5.06 M/UL — SIGNIFICANT CHANGE UP (ref 4.2–5.8)
RBC # BLD: 5.15 M/UL — SIGNIFICANT CHANGE UP (ref 4.2–5.8)
RBC # FLD: 12.2 % — SIGNIFICANT CHANGE UP (ref 10.3–14.5)
RBC # FLD: 12.3 % — SIGNIFICANT CHANGE UP (ref 10.3–14.5)
SODIUM SERPL-SCNC: 136 MMOL/L — SIGNIFICANT CHANGE UP (ref 135–145)
WBC # BLD: 6.47 K/UL — SIGNIFICANT CHANGE UP (ref 3.8–10.5)
WBC # BLD: 6.81 K/UL — SIGNIFICANT CHANGE UP (ref 3.8–10.5)
WBC # FLD AUTO: 6.47 K/UL — SIGNIFICANT CHANGE UP (ref 3.8–10.5)
WBC # FLD AUTO: 6.81 K/UL — SIGNIFICANT CHANGE UP (ref 3.8–10.5)

## 2022-07-01 PROCEDURE — 99253 IP/OBS CNSLTJ NEW/EST LOW 45: CPT | Mod: GC

## 2022-07-01 PROCEDURE — 93306 TTE W/DOPPLER COMPLETE: CPT | Mod: 26

## 2022-07-01 PROCEDURE — 99233 SBSQ HOSP IP/OBS HIGH 50: CPT | Mod: 25

## 2022-07-01 RX ORDER — ASPIRIN/CALCIUM CARB/MAGNESIUM 324 MG
81 TABLET ORAL DAILY
Refills: 0 | Status: DISCONTINUED | OUTPATIENT
Start: 2022-07-01 | End: 2022-07-02

## 2022-07-01 RX ORDER — PANTOPRAZOLE SODIUM 20 MG/1
40 TABLET, DELAYED RELEASE ORAL
Refills: 0 | Status: DISCONTINUED | OUTPATIENT
Start: 2022-07-01 | End: 2022-07-02

## 2022-07-01 RX ORDER — ENOXAPARIN SODIUM 100 MG/ML
40 INJECTION SUBCUTANEOUS EVERY 24 HOURS
Refills: 0 | Status: DISCONTINUED | OUTPATIENT
Start: 2022-07-02 | End: 2022-07-02

## 2022-07-01 RX ADMIN — Medication 81 MILLIGRAM(S): at 12:59

## 2022-07-01 RX ADMIN — PANTOPRAZOLE SODIUM 10 MG/HR: 20 TABLET, DELAYED RELEASE ORAL at 08:37

## 2022-07-01 RX ADMIN — PANTOPRAZOLE SODIUM 40 MILLIGRAM(S): 20 TABLET, DELAYED RELEASE ORAL at 18:01

## 2022-07-01 RX ADMIN — ATORVASTATIN CALCIUM 80 MILLIGRAM(S): 80 TABLET, FILM COATED ORAL at 21:09

## 2022-07-01 RX ADMIN — LOSARTAN POTASSIUM 50 MILLIGRAM(S): 100 TABLET, FILM COATED ORAL at 05:57

## 2022-07-01 NOTE — PATIENT PROFILE ADULT - INTERNATIONAL TRAVEL
H&P reviewed  After examining the patient I find no changes in the patients condition since the H&P had been written  There were no vitals filed for this visit 
No

## 2022-07-01 NOTE — CONSULT NOTE ADULT - ASSESSMENT
66 yo male with hx of essential HTN, HLD, COVID and alcohol use who was consulted by GI for evaluation of hematemesis.     #hematemesis  #h/o alcohol use   - 1 episode of reported hematemesis at home 2 days ago, no recurrent episode in hospital  - hx of alcohol use, no known hx of hiatal hernia  - ddx: radha-diamond syndrome, vs esophagitis (infectious/reflux/medication-induced) vs. esophageal/PUD vs. 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     #hematemesis - [place] Blachford score of 0  #h/o alcohol use   - 1 episode of reported hematemesis at home 2 days ago, no recurrent episode in hospital  - hx of alcohol use, no known hx of hiatal hernia  - ddx: radha-diamond syndrome, vs esophagitis (infectious/reflux/medication-induced) vs. esophageal/PUD vs. 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

## 2022-07-01 NOTE — CONSULT NOTE ADULT - ATTENDING COMMENTS
Agree with above. Patient had episode of small amount of hematemesis now resolved. Admitted with chest pain. He has no anemia, no further signs of bleeding, stable vital signs and [place] Bl[place]ford score of 0. No urgent need for EGD inpatient, can likely be safely discharged home for outpatient follow-up as per data on patients with [place] Bl[place] scores of 0. Can give PPI PO, advance diet as tolerated. Recommend workup for chest pain.

## 2022-07-01 NOTE — CONSULT NOTE ADULT - SUBJECTIVE AND OBJECTIVE BOX
68 yo Gaurav-speaking male with history of essential HTN, HLD, COVID (vaccinated x3) and alcohol use who presents to ED with chest discomfort and one episode of hematemesis x2 days ago (6/29). The patient lives at home with his wife and children. 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 The patient states to days ago, he came back from work and his wife handed him a glass of cold water/soda. He sat down on the sofa and a few moments after felt nauseous, started retching, and subsequently had a yellow/jello like-output followed by a shot glass size of bloody output. He had no recurrent episode of nausea or emesis, however, the following day developed left-sided chest discomfort with no radiation to his arm, jaw, or back. The persistent chest discomfort prompted the patient's son to bring the pt to the ED for further evaluation.     The patient denies a similar episode of hematemesis ever before. The patient does have a history of chronic alcohol use for the past 30 years. He drinks 1-2 shots of whiskey with his dinner 2-3 days in the weeks and every weekend. He also recalls drinking mixed drinks for occasions. His last alcoholic intake was 2 days ago prior to the episode of hematemesis. He any history of alcoholic withdrawal or alcoholic dependency. The patient follows an outpatient gastroenterologist and underwent his first colonoscopy 3 years ago and told to follow-up in the 10 years for a repeat colonoscopy. He is unsure of any EGD or any history of H.Pylori, PUD, gastropathy, or GERD. The patient denies taking NSAIDs other than Tylenol as needed. Has regular bowel movements; denies any hematochezia, melena, dysphagia, liver disease, recent exposure to sick contacts, fever, chills, dietary changes, or shortness of breath.     In the ED, the patient is laying comfortably in bed, VSS, no recurrently episode of hematemesis since 2 days ago. Continues to have left-sided chest discomfort, improved from earlier today. Tele NSR. VSS. Hgb 15, at baseline (16). GI consulted for hematemesis.       MEDICATIONS  (STANDING):  atorvastatin 80 milliGRAM(s) Oral at bedtime  losartan 50 milliGRAM(s) Oral daily  pantoprazole Infusion 8 mG/Hr (10 mL/Hr) IV Continuous <Continuous>    MEDICATIONS  (PRN):  acetaminophen     Tablet .. 650 milliGRAM(s) Oral every 6 hours PRN Temp greater or equal to 38C (100.4F), Mild Pain (1 - 3)      PHYSICAL EXAM:  Vital Signs Last 24 Hrs  T(C): 36.4 (01 Jul 2022 07:39), Max: 36.9 (30 Jun 2022 18:11)  T(F): 97.6 (01 Jul 2022 07:39), Max: 98.4 (30 Jun 2022 18:11)  HR: 59 (01 Jul 2022 07:39) (59 - 74)  BP: 147/88 (01 Jul 2022 07:39) (126/74 - 164/84)  BP(mean): --  RR: 17 (01 Jul 2022 07:39) (16 - 21)  SpO2: 96% (01 Jul 2022 07:39) (95% - 99%)    CONSTITUTIONAL: NAD, well-developed  RESPIRATORY: Normal respiratory effort; lungs are clear to auscultation bilaterally  CARDIOVASCULAR: Regular rate and rhythm, normal S1 and S2, no murmur/rub/gallop; No lower extremity edema; Peripheral pulses are 2+ bilaterally  ABDOMEN: Nontender to palpation, normoactive bowel sounds, no rebound/guarding; No hepatosplenomegaly  MUSCLOSKELETAL: no clubbing or cyanosis of digits; no joint swelling or tenderness to palpation  PSYCH: A+O to person, place, and time; affect appropriate    LABS:                        15.2   6.47  )-----------( 238      ( 01 Jul 2022 06:08 )             45.0     07-01    136  |  101  |  16  ----------------------------<  107<H>  3.6   |  24  |  0.87    Ca    9.4      01 Jul 2022 06:08    TPro  7.4  /  Alb  4.3  /  TBili  0.4  /  DBili  x   /  AST  27  /  ALT  21  /  AlkPhos  62  06-30    PT/INR - ( 30 Jun 2022 21:36 )   PT: 13.1 sec;   INR: 1.14 ratio         PTT - ( 30 Jun 2022 21:36 )  PTT:32.2 sec     Patient interviewed in [facility].    68 yo Gaurav-speaking male with history of essential HTN, HLD, COVID (vaccinated x3) and alcohol use who presents to ED with chest discomfort and one episode of hematemesis x2 days ago (6/29). The patient lives at home with his wife and children. 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 The patient states to days ago, he came back from work and his wife handed him a glass of cold water/soda. He sat down on the sofa and a few moments after felt nauseous, started retching, and subsequently had a yellow/jello like-output followed by a shot glass size of bloody output. He had no recurrent episode of nausea or emesis, however, the following day developed left-sided chest discomfort with no radiation to his arm, jaw, or back. The persistent chest discomfort prompted the patient's son to bring the pt to the ED for further evaluation.     The patient denies a similar episode of hematemesis ever before. The patient does have a history of chronic alcohol use for the past 30 years. He drinks 1-2 shots of whiskey with his dinner 2-3 days in the weeks and every weekend. He also recalls drinking mixed drinks for occasions. His last alcoholic intake was 2 days ago prior to the episode of hematemesis. He any history of alcoholic withdrawal or alcoholic dependency. The patient follows an outpatient gastroenterologist and underwent his first colonoscopy 3 years ago and told to follow-up in the 10 years for a repeat colonoscopy. He is unsure of any EGD or any history of H.Pylori, PUD, gastropathy, or GERD. The patient denies taking NSAIDs other than Tylenol as needed. Has regular bowel movements; denies any hematochezia, melena, dysphagia, liver disease, recent exposure to sick contacts, fever, chills, dietary changes, or shortness of breath.     In the ED, the patient is laying comfortably in bed, VSS, no recurrently episode of hematemesis since 2 days ago. Continues to have left-sided chest discomfort, improved from earlier today. Tele NSR. VSS. Hgb 15, at baseline (16). GI consulted for hematemesis.     Past Medical, Past Surgical, and Family History:  PAST MEDICAL HISTORY:  2019 novel coronavirus disease (COVID-19) March 2021    COVID-19 vaccine series completed     HLD (hyperlipidemia)     HTN (hypertension).     PAST SURGICAL HISTORY:  No significant past surgical history.     FAMILY HISTORY:  No pertinent family history in first degree relatives. No pertinent family history of: coronary disease.     Social History:  Social History (marital status, living situation, occupation, tobacco use, alcohol and drug use, and sexual history): NO tobacco.    Rare ethanol, negative CAGE screen.    Previously worked in construction.      MEDICATIONS  (STANDING):  atorvastatin 80 milliGRAM(s) Oral at bedtime  losartan 50 milliGRAM(s) Oral daily  pantoprazole Infusion 8 mG/Hr (10 mL/Hr) IV Continuous <Continuous>    MEDICATIONS  (PRN):  acetaminophen     Tablet .. 650 milliGRAM(s) Oral every 6 hours PRN Temp greater or equal to 38C (100.4F), Mild Pain (1 - 3)      ROS:  General: No weight loss, fevers/chills  HEENT: No cough, no runny nose  EYes: No blurry vision  Chest: No cough, dyspnea  Cardiac: +Chest pain; no DELACRUZ  Abdomen: +Emeiss/hemeatmesis x 1; no dysphagia, no abdominal pain  Uro: No dysuria  Skin: No rashes, bruising  Neuro: No headaches, weakness        PHYSICAL EXAM:  Vital Signs Last 24 Hrs  T(C): 36.4 (01 Jul 2022 07:39), Max: 36.9 (30 Jun 2022 18:11)  T(F): 97.6 (01 Jul 2022 07:39), Max: 98.4 (30 Jun 2022 18:11)  HR: 59 (01 Jul 2022 07:39) (59 - 74)  BP: 147/88 (01 Jul 2022 07:39) (126/74 - 164/84)  BP(mean): --  RR: 17 (01 Jul 2022 07:39) (16 - 21)  SpO2: 96% (01 Jul 2022 07:39) (95% - 99%)    CONSTITUTIONAL: NAD, well-developed  HEENT: supple, no overt masses  RESPIRATORY: Normal respiratory effort; lungs are clear to auscultation bilaterally  CARDIOVASCULAR: Regular rate and rhythm, normal S1 and S2, no murmur/rub/gallop; No lower extremity edema; Peripheral pulses are 2+ bilaterally  ABDOMEN: Nontender to palpation, normoactive bowel sounds, no rebound/guarding; No hepatosplenomegaly  MUSCLOSKELETAL: no clubbing or cyanosis of digits; no joint swelling or tenderness to palpation  Skin: No rashes  Neuro: no focal deficits, A+O x 3  PSYCH: calm; affect appropriate    LABS:                        15.2   6.47  )-----------( 238      ( 01 Jul 2022 06:08 )             45.0     07-01    136  |  101  |  16  ----------------------------<  107<H>  3.6   |  24  |  0.87    Ca    9.4      01 Jul 2022 06:08    TPro  7.4  /  Alb  4.3  /  TBili  0.4  /  DBili  x   /  AST  27  /  ALT  21  /  AlkPhos  62  06-30    PT/INR - ( 30 Jun 2022 21:36 )   PT: 13.1 sec;   INR: 1.14 ratio         PTT - ( 30 Jun 2022 21:36 )  PTT:32.2 sec

## 2022-07-02 ENCOUNTER — TRANSCRIPTION ENCOUNTER (OUTPATIENT)
Age: 67
End: 2022-07-02

## 2022-07-02 VITALS
DIASTOLIC BLOOD PRESSURE: 67 MMHG | HEART RATE: 74 BPM | TEMPERATURE: 99 F | OXYGEN SATURATION: 94 % | RESPIRATION RATE: 18 BRPM | SYSTOLIC BLOOD PRESSURE: 111 MMHG

## 2022-07-02 PROCEDURE — 82803 BLOOD GASES ANY COMBINATION: CPT

## 2022-07-02 PROCEDURE — 85025 COMPLETE CBC W/AUTO DIFF WBC: CPT

## 2022-07-02 PROCEDURE — 80053 COMPREHEN METABOLIC PANEL: CPT

## 2022-07-02 PROCEDURE — 85014 HEMATOCRIT: CPT

## 2022-07-02 PROCEDURE — 84295 ASSAY OF SERUM SODIUM: CPT

## 2022-07-02 PROCEDURE — 36415 COLL VENOUS BLD VENIPUNCTURE: CPT

## 2022-07-02 PROCEDURE — 84132 ASSAY OF SERUM POTASSIUM: CPT

## 2022-07-02 PROCEDURE — 71046 X-RAY EXAM CHEST 2 VIEWS: CPT

## 2022-07-02 PROCEDURE — 86901 BLOOD TYPING SEROLOGIC RH(D): CPT

## 2022-07-02 PROCEDURE — 84484 ASSAY OF TROPONIN QUANT: CPT

## 2022-07-02 PROCEDURE — 82947 ASSAY GLUCOSE BLOOD QUANT: CPT

## 2022-07-02 PROCEDURE — 82435 ASSAY OF BLOOD CHLORIDE: CPT

## 2022-07-02 PROCEDURE — 85610 PROTHROMBIN TIME: CPT

## 2022-07-02 PROCEDURE — 85018 HEMOGLOBIN: CPT

## 2022-07-02 PROCEDURE — 96374 THER/PROPH/DIAG INJ IV PUSH: CPT

## 2022-07-02 PROCEDURE — U0005: CPT

## 2022-07-02 PROCEDURE — 86850 RBC ANTIBODY SCREEN: CPT

## 2022-07-02 PROCEDURE — 99239 HOSP IP/OBS DSCHRG MGMT >30: CPT

## 2022-07-02 PROCEDURE — 86803 HEPATITIS C AB TEST: CPT

## 2022-07-02 PROCEDURE — 99285 EMERGENCY DEPT VISIT HI MDM: CPT

## 2022-07-02 PROCEDURE — 85730 THROMBOPLASTIN TIME PARTIAL: CPT

## 2022-07-02 PROCEDURE — 83690 ASSAY OF LIPASE: CPT

## 2022-07-02 PROCEDURE — 83605 ASSAY OF LACTIC ACID: CPT

## 2022-07-02 PROCEDURE — U0003: CPT

## 2022-07-02 PROCEDURE — 86900 BLOOD TYPING SEROLOGIC ABO: CPT

## 2022-07-02 PROCEDURE — 82962 GLUCOSE BLOOD TEST: CPT

## 2022-07-02 PROCEDURE — 82272 OCCULT BLD FECES 1-3 TESTS: CPT

## 2022-07-02 PROCEDURE — 82330 ASSAY OF CALCIUM: CPT

## 2022-07-02 PROCEDURE — 93306 TTE W/DOPPLER COMPLETE: CPT

## 2022-07-02 PROCEDURE — 80048 BASIC METABOLIC PNL TOTAL CA: CPT

## 2022-07-02 RX ORDER — METFORMIN HYDROCHLORIDE 850 MG/1
0 TABLET ORAL
Qty: 0 | Refills: 0 | DISCHARGE

## 2022-07-02 RX ORDER — METFORMIN HYDROCHLORIDE 850 MG/1
1 TABLET ORAL
Qty: 0 | Refills: 0 | DISCHARGE

## 2022-07-02 RX ORDER — PANTOPRAZOLE SODIUM 20 MG/1
1 TABLET, DELAYED RELEASE ORAL
Qty: 60 | Refills: 0
Start: 2022-07-02 | End: 2022-07-31

## 2022-07-02 RX ORDER — FENOFIBRATE,MICRONIZED 130 MG
1 CAPSULE ORAL
Qty: 0 | Refills: 0 | DISCHARGE

## 2022-07-02 RX ADMIN — LOSARTAN POTASSIUM 50 MILLIGRAM(S): 100 TABLET, FILM COATED ORAL at 05:34

## 2022-07-02 RX ADMIN — ENOXAPARIN SODIUM 40 MILLIGRAM(S): 100 INJECTION SUBCUTANEOUS at 05:34

## 2022-07-02 RX ADMIN — PANTOPRAZOLE SODIUM 40 MILLIGRAM(S): 20 TABLET, DELAYED RELEASE ORAL at 05:34

## 2022-07-02 RX ADMIN — Medication 81 MILLIGRAM(S): at 12:37

## 2022-07-02 NOTE — PROGRESS NOTE ADULT - PROBLEM SELECTOR PLAN 1
H/H stable  GI eval appreciated. No EGD as likely radha diamond tear  Continue PPI  Trend cbc daily and maintain active T&S  resume aspirin  Diet as tolerated
H/H stable  GI eval appreciated. Possible EGD  Continue PPI  Trend cbc daily and maintain active T&S  resume aspirin  On CLD, advance to DASH if no plans for procedure

## 2022-07-02 NOTE — DISCHARGE NOTE NURSING/CASE MANAGEMENT/SOCIAL WORK - NSDCPEFALRISK_GEN_ALL_CORE
For information on Fall & Injury Prevention, visit: [email or web address] OR  [email or web address] OR  https://www.cdc.gov/steadi/patient.html

## 2022-07-02 NOTE — DISCHARGE NOTE PROVIDER - NSDCFUADDAPPT_GEN_ALL_CORE_FT
Please follow up with your PCP and GI in a week for further workup and management including Endoscopy

## 2022-07-02 NOTE — DISCHARGE NOTE PROVIDER - NSDCMRMEDTOKEN_GEN_ALL_CORE_FT
Received refill request for Flovent.   Last office visit: 11/5/19.   Last filled 4/11/18.    According to the [facility] refill policy, Celeste Craig's Prescription refill request  Approved.     Aspirin Enteric Coated 81 mg oral delayed release tablet: 1 tab(s) orally once a day  fenofibrate 134 mg oral capsule: 1 cap(s) orally once a day  losartan 50 mg oral tablet: 1 tab(s) orally once a day  metFORMIN 500 mg oral tablet: 1 tab(s) orally once a day  pantoprazole 40 mg oral delayed release tablet: 1 tab(s) orally 2 times a day  Pepcid 20 mg oral tablet: 1 tab(s) orally once a day   Zocor 20 mg oral tablet: 1 tab(s) orally once a day (at bedtime)

## 2022-07-02 NOTE — CHART NOTE - NSCHARTNOTEFT_GEN_A_CORE
pt seen as per consult for assessment/education due 7/3. NP reached out to RD as per pt son request. Discussed case with NP. pt consumes 1-2 drink of hard liquor daily as per EMR. Plan for pt to be discharged today. RD provided son with written and verbal Sobriety Diet Ed and GERD Nutrition Therapy. pt speaks Gaurav. Son speaks English.

## 2022-07-02 NOTE — PROGRESS NOTE ADULT - SUBJECTIVE AND OBJECTIVE BOX
[facility] Division of Hospital Medicine  Andree Nielsen DO   Available via [employer] Teams      Patient is a 67y old  Male who presents with a chief complaint of Episode of red blood emesis x2 days ago (01 Jul 2022 09:08)      SUBJECTIVE / OVERNIGHT EVENTS:  Seen in the ER.  No further episodes of N/V/hematemesis overnight  No abd pain, CP, SOB    MEDICATIONS  (STANDING):  aspirin enteric coated 81 milliGRAM(s) Oral daily  atorvastatin 80 milliGRAM(s) Oral at bedtime  losartan 50 milliGRAM(s) Oral daily  pantoprazole Infusion 8 mG/Hr (10 mL/Hr) IV Continuous <Continuous>    MEDICATIONS  (PRN):  acetaminophen     Tablet .. 650 milliGRAM(s) Oral every 6 hours PRN Temp greater or equal to 38C (100.4F), Mild Pain (1 - 3)      CAPILLARY BLOOD GLUCOSE        I&O's Summary      PHYSICAL EXAM:  Vital Signs Last 24 Hrs  T(C): 36.4 (01 Jul 2022 07:39), Max: 36.9 (30 Jun 2022 18:11)  T(F): 97.6 (01 Jul 2022 07:39), Max: 98.4 (30 Jun 2022 18:11)  HR: 59 (01 Jul 2022 07:39) (59 - 74)  BP: 147/88 (01 Jul 2022 07:39) (126/74 - 164/84)  BP(mean): --  RR: 17 (01 Jul 2022 07:39) (16 - 21)  SpO2: 96% (01 Jul 2022 07:39) (95% - 99%)    CONSTITUTIONAL: NAD, well-developed, well-groomed  EYES: PERRL; conjunctiva and sclera clear  RESPIRATORY: Normal respiratory effort; lungs are clear to auscultation bilaterally  CARDIOVASCULAR: Regular rate and rhythm, normal S1 and S2; No lower extremity edema  ABDOMEN: Nontender to palpation, normoactive bowel sounds, no rebound/guarding  MUSCULOSKELETAL: no clubbing or cyanosis of digits; no joint swelling or tenderness to palpation  PSYCH: A+O to person, place, and time; affect appropriate  NEUROLOGY: CN 2-12 are intact and symmetric; no gross sensory deficits   SKIN: No rashes; no palpable lesions    LABS:                        15.2   6.47  )-----------( 238      ( 01 Jul 2022 06:08 )             45.0     07-01    136  |  101  |  16  ----------------------------<  107<H>  3.6   |  24  |  0.87    Ca    9.4      01 Jul 2022 06:08    TPro  7.4  /  Alb  4.3  /  TBili  0.4  /  DBili  x   /  AST  27  /  ALT  21  /  AlkPhos  62  06-30    PT/INR - ( 30 Jun 2022 21:36 )   PT: 13.1 sec;   INR: 1.14 ratio         PTT - ( 30 Jun 2022 21:36 )  PTT:32.2 sec            RADIOLOGY & ADDITIONAL TESTS:  Results Reviewed:   Imaging Personally Reviewed:  Electrocardiogram Personally Reviewed:    COORDINATION OF CARE:  Care Discussed with Consultants/Other Providers [Y/N]:  Prior or Outpatient Records Reviewed [Y/N]:  
Patient is a 67y old  Male who presents with a chief complaint of Episode of red blood emesis yesterday, sharp LEFT sided chest pain (02 Jul 2022 11:02)      SUBJECTIVE / OVERNIGHT EVENTS: Patient seen and examined at bedside. No acute events overnight. No recurrent hematemesis and has been tolerating diet.    MEDICATIONS  (STANDING):  aspirin enteric coated 81 milliGRAM(s) Oral daily  atorvastatin 80 milliGRAM(s) Oral at bedtime  enoxaparin Injectable 40 milliGRAM(s) SubCutaneous every 24 hours  losartan 50 milliGRAM(s) Oral daily  pantoprazole    Tablet 40 milliGRAM(s) Oral two times a day    MEDICATIONS  (PRN):  acetaminophen     Tablet .. 650 milliGRAM(s) Oral every 6 hours PRN Temp greater or equal to 38C (100.4F), Mild Pain (1 - 3)      CAPILLARY BLOOD GLUCOSE      POCT Blood Glucose.: 107 mg/dL (01 Jul 2022 21:58)  POCT Blood Glucose.: 99 mg/dL (01 Jul 2022 16:49)    I&O's Summary    01 Jul 2022 07:01  -  02 Jul 2022 07:00  --------------------------------------------------------  IN: 420 mL / OUT: 0 mL / NET: 420 mL    02 Jul 2022 07:01  -  02 Jul 2022 14:29  --------------------------------------------------------  IN: 840 mL / OUT: 0 mL / NET: 840 mL        PHYSICAL EXAM:  Vital Signs Last 24 Hrs  T(C): 37 (02 Jul 2022 12:50), Max: 37 (02 Jul 2022 12:50)  T(F): 98.6 (02 Jul 2022 12:50), Max: 98.6 (02 Jul 2022 12:50)  HR: 74 (02 Jul 2022 12:50) (60 - 74)  BP: 111/67 (02 Jul 2022 12:50) (108/63 - 125/75)  BP(mean): --  RR: 18 (02 Jul 2022 12:50) (18 - 20)  SpO2: 94% (02 Jul 2022 12:50) (94% - 98%)    GEN: male in NAD, appears comfortable, no diaphoresis  EYES: No scleral injection, PERRL, EOMI  ENTM: neck supple & symmetric without tracheal deviation, moist membranes, no gross hearing impairment, thyroid gland not enlarged  CV: +S1/S2, no m/r/g, no abdominal bruit, no LE edema  RESP: breathing comfortably, no respiratory accessory muscle use, CTAB, no w/r/r  GI: normoactive BS, soft, NTND, no rebounding/guarding, no palpable masses    LABS:                        15.2   6.47  )-----------( 238      ( 01 Jul 2022 06:08 )             45.0     07-01    136  |  101  |  16  ----------------------------<  107<H>  3.6   |  24  |  0.87    Ca    9.4      01 Jul 2022 06:08    TPro  7.4  /  Alb  4.3  /  TBili  0.4  /  DBili  x   /  AST  27  /  ALT  21  /  AlkPhos  62  06-30    PT/INR - ( 30 Jun 2022 21:36 )   PT: 13.1 sec;   INR: 1.14 ratio         PTT - ( 30 Jun 2022 21:36 )  PTT:32.2 sec            RADIOLOGY & ADDITIONAL TESTS:  Results Reviewed:   Imaging Personally Reviewed:  Electrocardiogram Personally Reviewed:    COORDINATION OF CARE:  Care Discussed with Continue withsultants/Other Providers [Y/N]:  Prior or Outpatient Records Reviewed [Y/N]:

## 2022-07-02 NOTE — PROGRESS NOTE ADULT - REASON FOR ADMISSION
Episode of red blood emesis yesterday, sharp LEFT sided chest pain
Episode of red blood emesis yesterday, sharp LEFT sided chest pain

## 2022-07-02 NOTE — DISCHARGE NOTE PROVIDER - NSDCCPTREATMENT_GEN_ALL_CORE_FT
PRINCIPAL PROCEDURE  Procedure: Transthoracic echocardiography (TTE)  Findings and Treatment: EF 64%  Observations:  Mitral Valve: Normal mitral valve. Minimal mitral  regurgitation.  Aortic Valve/Aorta: Normal trileaflet aortic valve.  Aortic Root: 3.9 cm.  LVOT diameter: 2.1 cm.  Left Atrium: Normal left atrium.  LA volume index = 20  cc/m2.  Left Ventricle: Normal left ventricular systolic function.  No segmental wall motion abnormalities. Normal left  ventricular internal dimensions and wall thicknesses.  Normal diastolic function  Right Heart: Normal right atrium. Normal right ventricular  size and function. Normal tricuspid valve. Minimal  tricuspid regurgitation. Normal pulmonic valve.  Pericardium/Pleura: Normal pericardium with no pericardial  effusion.  Hemodynamic: Estimated right atrial pressure is 8 mm Hg.  ------------------------------------------------------------------------  1. Normal left ventricular systolic function. No segmental  wall motion abnormalities.  2. Normal diastolic function  3. Normal right ventricular size and function.

## 2022-07-02 NOTE — DISCHARGE NOTE NURSING/CASE MANAGEMENT/SOCIAL WORK - PATIENT PORTAL LINK FT
You can access the FollowMyHealth Patient Portal offered by [facility] by registering at the following website: [email or web address]. By joining [facility]’s FollowMyHealth portal, you will also be able to view your health information using other applications (apps) compatible with our system.

## 2022-07-02 NOTE — DISCHARGE NOTE PROVIDER - HOSPITAL COURSE
67 year old male with a PMHx of HTN, HLD who presented to the ED with chest pain and small hematemesis.       Problem/Plan - 1:  ·  Problem: Upper gastrointestinal bleed.   ·  Plan: H/H stable  GI eval appreciated. outpatient EGD   Continue PPI  Trend cbc daily and maintain active T&S  resume aspirin  s/p CLD now advanced and well tolerated      Problem/Plan - 2:  ·  Problem: Chest pain.   ·  Plan: normal echo   continue asa, statin   No current chest pain  Trop neg x2, Q waves on EKG.     Problem/Plan - 3:  ·  Problem: Essential hypertension.   ·  Plan: Continue with home losartan.     Problem/Plan - 4:  ·  Problem: Need for prophylactic measure.     Patient seen by Dietitian for diet recommendation   Patient remains stable for DC with close follow up with PCP and Cards as per Dr. Hope

## 2022-07-02 NOTE — DISCHARGE NOTE PROVIDER - NSDCCPCAREPLAN_GEN_ALL_CORE_FT
PRINCIPAL DISCHARGE DIAGNOSIS  Diagnosis: Upper gastrointestinal bleed  Assessment and Plan of Treatment: You were seen and evaluated by GI at the hospital.  - 1 episode of reported hematemesis at home 2 days ago, no recurrent episode in hospital  - hx of alcohol use, no known hx of hiatal hernia  - ddx: radha-diamond syndrome, vs esophagitis (infectious/reflux/medication-induced) vs. esophageal/PUD vs. 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  HOME CARE INSTRUCTIONS  For the next few days, avoid physical activities that bring on chest pain. Continue physical activities as directed.  Strongly recommended that pt stops smoking   Avoid drinking alcohol.   Only take over-the-counter or prescription medicine for pain, discomfort, or fever as directed by your caregiver.  Follow your caregiver's suggestions for further testing if your chest pain does not go away.  Keep any follow-up appointments you made. If you do not go to an appointment, you could develop lasting (chronic) problems with pain. If there is any problem keeping an appointment, you must call to reschedule.   SEEK MEDICAL CARE IF:  You think you are having problems from the medicine you are taking. Read your medicine instructions carefully.  Your chest pain does not go away, even after treatment.  You develop a rash with blisters on your chest.  SEEK IMMEDIATE MEDICAL CARE IF:  You have increased chest pain or pain that spreads to your arm, neck, jaw, back, or abdomen.   You develop shortness of breath, an increasing cough, or you are coughing up blood.  You have severe back or abdominal pain, feel nauseous, or vomit.  You develop severe weakness, fainting, or chills.  You have a fever.  THIS IS AN EMERGENCY. Do not wait to see if the pain will go away. Get medical help at once. Call your local emergency services. Do not drive yourself to the hospital.      Diagnosis: Essential hypertension  Assessment and Plan of Treatment: Hypertension, also known simply as "high blood pressure" is very common, however can lead to many significant complications if left uncontrolled. When the blood pressure is elevated, the force the blood puts on the walls of the arteries is high and can lead to artery damage. Also, when the heart muscle has to pump blood against a high blood pressure, it thickens and enlarges, just like any muscle does when it has to do more work (think of a weight ). When the blood pressure is very high, people may feel a headache or tired. Some people can feel pounding in their head or have blurry vision. Hearing the heart beating in the ear especially at night can be a sign of high blood pressure. Eventually, symptoms of stroke, heart attack, heart failure or irregular heartbeats can occur  - Exercise: Doing cardiovascular exercise such as running, biking or swimming at least 30 minutes per day most days of the week is recommended to help keep blood pressure healthy  - Lose weight: Maintaining a normal BMI (body mass index) is very important in keeping blood pressure readings normal   - Avoid salt: Sodium in the diet increases the blood pressure in many ways. Salt comes in many foods, so just because you don't add salt to your food it does not mean that you are eating a low salt diet. Read labels and keep sodium intake to less than 2000 mg per day   - Avoid alcohol: Even 1 or 2 alcoholic drinks can significantly increase blood pressure   - DASH Diet: The DASH diet has been shown to reduce blood pressure   - Take all medication as prescribed.   - Follow up with your medical doctor for routine blood pressure monitoring at your next visit.   - Notify your doctor if you have any of the following symptoms:    - Dizziness, Lightheadedness, Blurry vision, Headache, Chest pain, Shortness of breath      Diagnosis: GERD (gastroesophageal reflux disease)  Assessment and Plan of Treatment: Possible GERD  HOME CARE INSTRUCTIONS  Change the factors that you can control. Ask your caregiver for guidance concerning weight loss, quitting smoking, and alcohol consumption.  Avoid foods and drinks that make your symptoms worse, such as:   Caffeine or alcoholic drinks.   Chocolate.   Peppermint or mint flavorings.  Garlic and onions.  Spicy foods.   Citrus fruits, such as oranges, dex, or limes.   Tomato-based foods such as sauce, chili, salsa, and pizza.  Fried and fatty foods.  Avoid lying down for the 3 hours prior to your bedtime or prior to taking a nap.  Eat small, frequent meals instead of large meals.   Wear loose-fitting clothing. Do not wear anything tight around your waist that causes pressure on your stomach.  Raise the head of your bed 6 to 8 inches with wood blocks to help you sleep. Extra pillows will not help.  Only take over-the-counter or prescription medicines for pain, discomfort, or fever as directed by your caregiver.   Do not take aspirin, ibuprofen, or other nonsteroidal anti-inflammatory drugs (NSAIDs).  SEEK IMMEDIATE MEDICAL CARE IF:  You have pain in your arms, neck, jaw, teeth, or back.   Your pain increases or changes in intensity or duration.   You develop nausea, vomiting, or sweating (diaphoresis).  You develop shortness of breath, or you faint.  Your vomit is green, yellow, black, or looks like coffee grounds or blood.  Your stool is red, bloody, or black.  These symptoms could be signs of other problems, such as heart disease, gastric bleeding, or esophageal bleeding.

## 2022-07-02 NOTE — PROGRESS NOTE ADULT - NSPROGADDITIONALINFOA_GEN_ALL_CORE
Patient medically stable. Do not suspect active cardiac or GI issues. Counseling performed with patient.    Discharge Time: 40 minutes
devan Solis

## 2022-07-02 NOTE — PROGRESS NOTE ADULT - ASSESSMENT
67 year old male with a PMHx of HTN, HLD who presented to the ED with chest pain and small hematemesis.      
67 year old male with a PMHx of HTN, HLD who presented to the ED with chest pain and small hematemesis.

## 2022-07-02 NOTE — PROGRESS NOTE ADULT - PROBLEM SELECTOR PLAN 2
Echo pending  continue asa, statin   No current chest pain  Trop neg x2, Q waves on EKG
TTE unremarkable  continue asa, statin   No current chest pain  Trop neg x2, Q waves on EKG

## 2022-08-23 NOTE — ED PROVIDER NOTE - INTERNATIONAL TRAVEL
Daily Note     Today's date: 2022  Patient name: Maribell Haynes  : 1952  MRN: [id number]  Referring provider: Cira Jurado*  Dx:   Encounter Diagnosis     ICD-10-CM    1  Chronic left shoulder pain  M25 512     G89 29    2  Closed traumatic displaced fracture of proximal end of left humerus, initial encounter  S4                   Subjective: Pt reports she was sore after LV  Skipped a day or two of the exercises due to soreness but got back to it after that  Objective: See treatment diary below      Assessment: Pt was sore following LV but still doing well overall as she continues to progress and see improvements daily with her ADL's  Able to continue to progress exercises  Had to explain correct form with HEP again to pt as she was confused about some of the exercises given  PROM continues to improve and AROM is starting to so signs of improvement  Tolerated treatment well  Patient demonstrated fatigue post treatment, exhibited good technique with therapeutic exercises and would benefit from continued PT      Plan: Continue per plan of care  Progress treatment as tolerated  Precautions: h/o L proximal humeral head fx  DOI: 22  Protocol: d/c sling on 22; begin AAROM, AROM on 22, begin resistive exercise on 22  Other factors: conservative tx, will consider reverse TSA PRN  EPOC: 22      HEP:  Access Code: [email or web address]  URL: [email or web address]  Date: 2022  Prepared by: Peter Tolentino    Exercises  · Shoulder Flexion Overhead with Dowel - 20 reps  · Seated Shoulder Flexion AAROM with Pulley Behind - 5 min hold  · Circular Shoulder Pendulum with Table Support - 1 min hold  · Standing Bent Over Single Arm Scapular Row with Table Support - 20 reps  · Standing Shoulder Flexion Full Range - 20 reps  · Standing Shoulder Abduction Full Range - 20 reps  · Bicep curls supinated - 20 reps          Manuals  8/9 8/11 8/16 8/23   R elbow PROM  WE WE WE         R sh' PROM  WE WE WE [address] WE WE [address] DANNI WE   RUE, sh' STM DANNI WE WE WE         RC iso flex/ext, ER/IR, abd/add     5"x10 WE WE                                             Neuro Re-Ed                                                                                                        Ther Ex             Pulleys    3 min  flx 4' flex 4' flx 5 min flx 4' flex 4' 3'/2'   Pendulums 1' 1' 1' 1'  1' 1' 1' 1' 2#  1 min   Cervical arom   x10 ea x20 ea  20       Wrist arom x20 x20 x20 x20         Elbow flex/ext x20 x20 x20 x20   1# x20  2# x20 np   Elbow sup/pron x20 x20 x20 x20         Shoulder shrug x20 x20 x20 x20         scap rtrxn   x20 x20         Towel squeeze x20 x20 x20 x20   20      Table slides  x10 ea x20 ea x20 ea  20 x20 ea x20     Cane flex, ABD passive      x10 ea x10 ea      Hnd held AA Flexion       x10 Wand x20 Wand x20 Wand x20   Sh' flex arom        x10 x20 x20   Sh' abd arom        x10 x20 x20   Bent over row         x20 1#  x20                             Ther Activity                                       Gait Training                                       Modalities             CP     5'        MHP         Begin 10' No

## 2023-01-06 NOTE — ED PROVIDER NOTE - WR ORDER NAME 1
[facility] MEDICINE DISCHARGE SUMMARY NOTE    Patient: Aspen Doherty Discharge Date: 1/6/2023   YOB: 1927 Admission Date: 12/26/2022  1:54 PM   MRN: [id number] Admission Length: 11 day(s)     PCP: Cortney Hung DO    Admitting Physician: No admitting provider for patient encounter. Discharge Physician: Shane Triplett MD       Admission Information     Admission Diagnoses:   COVID-19 infection   Minimal elevated troponin suspect troponin leak in setting of viral pneumonia /no non ST-elevation MI    Non complicated urinary tract infection  Sinus bradycardia [R00.1]  Fall in home, initial encounter [W19.XXXA, Y92.009]  Traumatic rhabdomyolysis, initial encounter (CMS/[facility]) [T79.6XXA]  COVID-19 virus detected [U07.1]     Admission Condition: good  Condition at Discharge:  good     Primary Discharge Diagnoses:   Fall in home, initial encounter  (primary encounter diagnosis)  Traumatic rhabdomyolysis, initial encounter (CMS/[facility])  Sinus bradycardia  COVID-19 virus detected      Secondary Discharge Diagnoses:   Patient Active Problem List   Diagnosis   • Constipation   • Gait instability   • General weakness   • Bacterial pneumonia   • Coronary artery disease involving native coronary artery of native heart without angina pectoris   • Fall in home, initial encounter       Code status:  Do Not Resuscitate       TCM Follow up      Medication changes and Reason:     i. Discontinued    none    Ii. Changed    None     Iii. New     Baby ASA     Discharge Medications: see after visit summary for full list     below      Summary of your Discharge Medications      Take these Medications      Details   aspirin 81 MG chewable tablet  Commonly known as: Aspirin 81   Chew 1 tablet by mouth daily.     Cholecalciferol 1.25 mg (50,000 units) capsule   Take 50,000 Units by mouth 1 day a week. On Sunday.     CoQ10 400 MG Cap   Take 400 mg by mouth daily.     DHEA 25 MG capsule   Take 25 mg by mouth daily (with breakfast).      DISPENSE   Take 2 tablets by mouth nightly. Bone Guard     EYE VITAMINS PO   Take 1 tablet by mouth daily. (Ocular factor)     FOLATE PO   Take 1 capsule by mouth daily.     NON FORMULARY   Take 1 tablet by mouth daily. (Zn-Zyme Forte)     NON FORMULARY   Take 1 capsule by mouth 2 times daily. (Glucobalance)     NON FORMULARY   Take 3 tablets by mouth 2 times daily. (Bio- 3B-G)     NON FORMULARY   Take 2 tablets by mouth daily. (Iodizyme-HP)     NON FORMULARY   Take 1 tablet by mouth daily as needed. (Super Food)     NP Thyroid 60 MG tablet   Generic drug: thyroid  TAKE 1 TABLET BY MOUTH EVERY MORNING 30 MINUTES BEFORE BREAKFAST     PROBIOTIC + OMEGA-3 PO   Take 2 capsules by mouth daily.     TAURINE PO   Take 1 capsule by mouth daily.     VITAMIN B 12 PO   Take 1 tablet by mouth daily.     Vitamin C Powder   Mix 2½ tablespoons in liquid and drink daily.            Tests/treatment requiring follow up : None       Studies pending at time of discharge: None         Home Health referral:  No                  Follow-up Appointments   [address] [address] [phone]            Discharge Instructions     Diet: cardiac diet  Activity:  activity as tolerated   Wound Care: none needed Disposition: Skilled nursing facility: .       Other Patient  instructions:     None       Readmission risk factors:    None    LACE(5-9):Moderate  Total Score: 8           5 LACE Length of Stay    3 LACE Acute Admission        Criteria that do not apply:    LACE Charlson Comorbidity Index Evalution    LACE Visits to ED Previous 6 Months              Hospital Course   Admission Narrative:    Aspen Doherty is a 89 year old female who presented to the ED w/ Fall and Altered Mental Status    -89-year-old female comes to the emergency room on 12/26 for evaluation of increasing generalized weakness and confusion    On admission patient was noted to acute metabolic encephalopathy  dehydration sinus bradycardia minimal elevated troponin gets admitted for further workup and evaluation she was incidentally also found to have COVID and mild rhabdomyolysis nontraumatic    During her stay in the hospital she was treated conservatively with aggressive IV fluid hydration serial troponins were done which remained minimal elevated but is trending down   Seen by cardiology team     Initially patient was placed on colchicine prednisone aspirin Plavix for suspected pericarditis/evolving non ST-elevation MI after cardiology team has evaluated she--colchicine prednisone was discontinued  She has a known history of ischemic cardiomyopathy which has been managed medically follows with Cardiology team as outpatient she remains completely symptom-free while she was in hospital minimal elevated troponin was suspect related to viral pneumonia/dehydration and troponin leak rather than non ST-elevation MI  Cardiology team recommended that no need for Plavix on discharge continue baby aspirin    She will be continued on PPI while she is on aspirin     She was seen by PT OT gradually her oral intake is improving she remains quite weak and frail subacute rehab placement is recommended  She is medically stable and can be discharged to rehab facility   Because of her advanced age frail condition poor functional status she remains at high risk for deterioration as well as rehospitalization      1/5 patient was found to have foul-smelling urine urinalysis was done which shows urinary tract infection she will be continued on Ceftin for 5 days was non complicated UTI patient has no sepsis      Recommended that she get evaluated by primary care doctor or that team in next 1 week or so consider repeating CBC/BMP in next 1 week at rehab facility  Consults     IP Consult Orders (From admission, onward)     Start     Ordered    01/03/23 1040  Inpatient consult to Geriatrics  ONE TIME        Provider:  Colt Gregory MD     01/03/23 1039    01/03/23 1040  Inpatient consult to Cardiology  ONE TIME        Provider:  Romel Askew MD    01/03/23 1045                Other Procedures       None     Discharge Physical Exam     Visit Vitals  /63 (BP Location: LUE - Left upper extremity, Patient Position: Supine)   Pulse 60   Temp 97.2 °F (36.2 °C) (Axillary)   Resp 20   Ht 5' 2\" (1.575 m)   Wt 41.6 kg (91 lb 11.4 oz)   SpO2 94%   BMI 16.77 kg/m²         Gen:   Awake looks well/chronically ill, comfortable appearing female.       -cachectic/malnourished       HEENT:   pupils equal, round, reactive to light, extraocular movements intac, no pallor   or jaundice   Cardio:   Regular rate and rhythm, no murmurs, rub, gallop; S1/S2 normal, no jugular   venous distention, no lower extremity edema  Resp:   Clear to auscultation bilaterally, no retractions or increased work of breathing  GI:    positive bowel sounds soft, non-tender,non-distended  MS:     Range of motion normal t No clubbing, edema, or cyanosis.  Skin:   No rashes/lesions/ecchymoses/jaundice.  Neuro:  alert and oriented x3.No gross motor or sensory deficits. No facial droop or   dysarthria.  Psych:   Appropriate mood/affect.    Wt Readings from Last 3 Encounters:   12/31/22 41.6 kg (91 lb 11.4 oz)   06/05/22 41.3 kg (91 lb 1.6 oz)   02/16/22 40.8 kg (90 lb)         SIGNIFICANT DIAGNOSTIC STUDIES          DIAGNOSTIC STUDIES / SURGICAL PROCEDURES  All pertinent laboratory results were reviewed.      CT Hip Right   Final Result   IMPRESSION:      1. There is no right hip fracture. There is smooth cortical thickening in   the lateral/posterior aspect of the femoral neck accounting for   radiographic findings.   2. Severe right hip OA.         XR Chest AP or PA   Final Result   IMPRESSION:   1. There is no acute cardiopulmonary disease.   2. Osteopenia. There is subtle cortical step-off in the lateral aspect of   the right femoral neck concerning for potential nondisplaced  fracture.   Correlate for right hip pain. Recommend either dedicated radiographs of the   right hip or CT evaluation.      XR Pelvis 1 or 2 Views   Final Result   IMPRESSION:   1. There is no acute cardiopulmonary disease.   2. Osteopenia. There is subtle cortical step-off in the lateral aspect of   the right femoral neck concerning for potential nondisplaced fracture.   Correlate for right hip pain. Recommend either dedicated radiographs of the   right hip or CT evaluation.      CT HEAD WO CONTRAST   Final Result   IMPRESSION:   1. There is no acute intracranial abnormality.             CMS BEST PRACTICES:  NA      Time taken to coordinate discharge care:  < 30 min     Discharge instructions, medications and followup appointment were discussed with the patient and after visit summary was given.     Signed:    Shane Triplett MD  1/6/2023  11:26 AM    CC:   Cortney Hung DO     Xray Chest 2 Views PA/Lat

## 2023-09-13 NOTE — ED ADULT NURSE NOTE - EXTENSIONS OF SELF_ADULT
AUDIOLOGY REPORT    SUMMARY: Audiology visit completed. See audiogram for results. Abuse screening not completed due to same day appt with ENT clinic, where this is addressed.      RECOMMENDATIONS: Follow-up with ENT.      Fabian Herrera, CCC-A  Licensed Audiologist  [place] #[id number]     None

## 2024-06-24 ENCOUNTER — EMERGENCY (EMERGENCY)
Facility: HOSPITAL | Age: 69
LOS: 1 days | Discharge: HOME | End: 2024-06-24
Payer: COMMERCIAL

## 2024-06-24 ENCOUNTER — TRANSCRIPTION ENCOUNTER (OUTPATIENT)
Age: 69
End: 2024-06-24

## 2024-06-24 VITALS
OXYGEN SATURATION: 94 % | RESPIRATION RATE: 18 BRPM | SYSTOLIC BLOOD PRESSURE: 178 MMHG | WEIGHT: 184.97 LBS | TEMPERATURE: 99 F | DIASTOLIC BLOOD PRESSURE: 90 MMHG | HEIGHT: 66 IN | HEART RATE: 90 BPM

## 2024-06-24 LAB
ALBUMIN SERPL ELPH-MCNC: 4 G/DL — SIGNIFICANT CHANGE UP (ref 3.3–5)
ALP SERPL-CCNC: 66 U/L — SIGNIFICANT CHANGE UP (ref 40–120)
ALT FLD-CCNC: 22 U/L — SIGNIFICANT CHANGE UP (ref 10–45)
ANION GAP SERPL CALC-SCNC: 17 MMOL/L — SIGNIFICANT CHANGE UP (ref 5–17)
APPEARANCE UR: CLEAR — SIGNIFICANT CHANGE UP
AST SERPL-CCNC: 24 U/L — SIGNIFICANT CHANGE UP (ref 10–40)
BACTERIA # UR AUTO: NEGATIVE /HPF — SIGNIFICANT CHANGE UP
BASE EXCESS BLDV CALC-SCNC: 2.2 MMOL/L — SIGNIFICANT CHANGE UP (ref -2–3)
BASOPHILS # BLD AUTO: 0.06 K/UL — SIGNIFICANT CHANGE UP (ref 0–0.2)
BASOPHILS NFR BLD AUTO: 0.6 % — SIGNIFICANT CHANGE UP (ref 0–2)
BILIRUB SERPL-MCNC: 0.7 MG/DL — SIGNIFICANT CHANGE UP (ref 0.2–1.2)
BILIRUB UR-MCNC: NEGATIVE — SIGNIFICANT CHANGE UP
BUN SERPL-MCNC: 16 MG/DL — SIGNIFICANT CHANGE UP (ref 7–23)
CA-I SERPL-SCNC: 1.17 MMOL/L — SIGNIFICANT CHANGE UP (ref 1.15–1.33)
CALCIUM SERPL-MCNC: 9 MG/DL — SIGNIFICANT CHANGE UP (ref 8.4–10.5)
CAST: 0 /LPF — SIGNIFICANT CHANGE UP (ref 0–4)
CHLORIDE BLDV-SCNC: 94 MMOL/L — LOW (ref 96–108)
CHLORIDE SERPL-SCNC: 93 MMOL/L — LOW (ref 96–108)
CO2 BLDV-SCNC: 27 MMOL/L — HIGH (ref 22–26)
CO2 SERPL-SCNC: 21 MMOL/L — LOW (ref 22–31)
COLOR SPEC: YELLOW — SIGNIFICANT CHANGE UP
CREAT SERPL-MCNC: 0.89 MG/DL — SIGNIFICANT CHANGE UP (ref 0.5–1.3)
DIFF PNL FLD: ABNORMAL
EGFR: 93 ML/MIN/1.73M2 — SIGNIFICANT CHANGE UP
EOSINOPHIL # BLD AUTO: 0.63 K/UL — HIGH (ref 0–0.5)
EOSINOPHIL NFR BLD AUTO: 6.5 % — HIGH (ref 0–6)
FLUAV AG NPH QL: SIGNIFICANT CHANGE UP
FLUBV AG NPH QL: SIGNIFICANT CHANGE UP
GAS PNL BLDV: 125 MMOL/L — LOW (ref 136–145)
GAS PNL BLDV: SIGNIFICANT CHANGE UP
GLUCOSE BLDV-MCNC: 112 MG/DL — HIGH (ref 70–99)
GLUCOSE SERPL-MCNC: 108 MG/DL — HIGH (ref 70–99)
GLUCOSE UR QL: NEGATIVE MG/DL — SIGNIFICANT CHANGE UP
HCO3 BLDV-SCNC: 26 MMOL/L — SIGNIFICANT CHANGE UP (ref 22–29)
HCT VFR BLD CALC: 42.5 % — SIGNIFICANT CHANGE UP (ref 39–50)
HCT VFR BLDA CALC: 44 % — SIGNIFICANT CHANGE UP (ref 39–51)
HGB BLD CALC-MCNC: 14.7 G/DL — SIGNIFICANT CHANGE UP (ref 12.6–17.4)
HGB BLD-MCNC: 14.2 G/DL — SIGNIFICANT CHANGE UP (ref 13–17)
IMM GRANULOCYTES NFR BLD AUTO: 0.3 % — SIGNIFICANT CHANGE UP (ref 0–0.9)
KETONES UR-MCNC: NEGATIVE MG/DL — SIGNIFICANT CHANGE UP
LACTATE BLDV-MCNC: 0.9 MMOL/L — SIGNIFICANT CHANGE UP (ref 0.5–2)
LEUKOCYTE ESTERASE UR-ACNC: NEGATIVE — SIGNIFICANT CHANGE UP
LYMPHOCYTES # BLD AUTO: 2.05 K/UL — SIGNIFICANT CHANGE UP (ref 1–3.3)
LYMPHOCYTES # BLD AUTO: 21.2 % — SIGNIFICANT CHANGE UP (ref 13–44)
MCHC RBC-ENTMCNC: 29.3 PG — SIGNIFICANT CHANGE UP (ref 27–34)
MCHC RBC-ENTMCNC: 33.4 GM/DL — SIGNIFICANT CHANGE UP (ref 32–36)
MCV RBC AUTO: 87.6 FL — SIGNIFICANT CHANGE UP (ref 80–100)
MONOCYTES # BLD AUTO: 1.02 K/UL — HIGH (ref 0–0.9)
MONOCYTES NFR BLD AUTO: 10.6 % — SIGNIFICANT CHANGE UP (ref 2–14)
NEUTROPHILS # BLD AUTO: 5.87 K/UL — SIGNIFICANT CHANGE UP (ref 1.8–7.4)
NEUTROPHILS NFR BLD AUTO: 60.8 % — SIGNIFICANT CHANGE UP (ref 43–77)
NITRITE UR-MCNC: NEGATIVE — SIGNIFICANT CHANGE UP
NRBC # BLD: 0 /100 WBCS — SIGNIFICANT CHANGE UP (ref 0–0)
NT-PROBNP SERPL-SCNC: 53 PG/ML — SIGNIFICANT CHANGE UP (ref 0–300)
PCO2 BLDV: 35 MMHG — LOW (ref 42–55)
PH BLDV: 7.47 — HIGH (ref 7.32–7.43)
PH UR: 6.5 — SIGNIFICANT CHANGE UP (ref 5–8)
PLATELET # BLD AUTO: 213 K/UL — SIGNIFICANT CHANGE UP (ref 150–400)
PO2 BLDV: 64 MMHG — HIGH (ref 25–45)
POTASSIUM BLDV-SCNC: 3.1 MMOL/L — LOW (ref 3.5–5.1)
POTASSIUM SERPL-MCNC: 3.3 MMOL/L — LOW (ref 3.5–5.3)
POTASSIUM SERPL-SCNC: 3.3 MMOL/L — LOW (ref 3.5–5.3)
PROT SERPL-MCNC: 7.1 G/DL — SIGNIFICANT CHANGE UP (ref 6–8.3)
PROT UR-MCNC: NEGATIVE MG/DL — SIGNIFICANT CHANGE UP
RBC # BLD: 4.85 M/UL — SIGNIFICANT CHANGE UP (ref 4.2–5.8)
RBC # FLD: 13 % — SIGNIFICANT CHANGE UP (ref 10.3–14.5)
RBC CASTS # UR COMP ASSIST: 1 /HPF — SIGNIFICANT CHANGE UP (ref 0–4)
REVIEW: SIGNIFICANT CHANGE UP
RSV RNA NPH QL NAA+NON-PROBE: SIGNIFICANT CHANGE UP
SAO2 % BLDV: 93.4 % — HIGH (ref 67–88)
SARS-COV-2 RNA SPEC QL NAA+PROBE: SIGNIFICANT CHANGE UP
SODIUM SERPL-SCNC: 131 MMOL/L — LOW (ref 135–145)
SP GR SPEC: <1.005 — LOW (ref 1–1.03)
SQUAMOUS # UR AUTO: 0 /HPF — SIGNIFICANT CHANGE UP (ref 0–5)
TROPONIN T, HIGH SENSITIVITY RESULT: 30 NG/L — SIGNIFICANT CHANGE UP (ref 0–51)
UROBILINOGEN FLD QL: 0.2 MG/DL — SIGNIFICANT CHANGE UP (ref 0.2–1)
WBC # BLD: 9.66 K/UL — SIGNIFICANT CHANGE UP (ref 3.8–10.5)
WBC # FLD AUTO: 9.66 K/UL — SIGNIFICANT CHANGE UP (ref 3.8–10.5)
WBC UR QL: 0 /HPF — SIGNIFICANT CHANGE UP (ref 0–5)

## 2024-06-24 PROCEDURE — 99285 EMERGENCY DEPT VISIT HI MDM: CPT

## 2024-06-24 PROCEDURE — 71275 CT ANGIOGRAPHY CHEST: CPT | Mod: 26,MC

## 2024-06-24 PROCEDURE — 71046 X-RAY EXAM CHEST 2 VIEWS: CPT | Mod: 26

## 2024-06-24 RX ORDER — POTASSIUM CHLORIDE 20 MEQ
40 PACKET (EA) ORAL EVERY 4 HOURS
Refills: 0 | Status: COMPLETED | OUTPATIENT
Start: 2024-06-24 | End: 2024-06-25

## 2024-06-24 NOTE — ED PROVIDER NOTE - PROGRESS NOTE DETAILS
Attending Mary:  pt re evaluated. Currently without sob, feeling better, defervesced. Possible viral illness, ct negative for pe, possible pna on my read vs atelectasis so would treat with azithromycin, some nodules as well. Will get ambulatory pulse ox, given pt well appearing no emergent findings on workup, no sig pmh will likely dc home which fam and pt are comfortable with . Attending Masom:  pt ambulated, no sig desaturation or sob. Stable for dc

## 2024-06-24 NOTE — ED ADULT NURSE NOTE - OBJECTIVE STATEMENT
Pt is a 68 y/o M with PMH of HTN and HLD presenting with 5 days of progressive SOB. Pt endorses dry cough, fever, headache, and recent hypertension with Systolic in the BP in the 200s at home. Endorses chest pain while coughing. Endorses recent travel to [place] returning June 14th with no known sick contacts. Upon assessment pt is a/o x 3 speaking coherently in full sentences. Pt is breathing slightly tachypneic and equal BL with no nasal flaring/retractions noted. Cardiac rhythm is NSR, abdomen is soft, nontender, and nondistended, skin is warm and dry. Pt denies vision changes, n/v/d, or changes in urinary/defecation habits. Pt is in stretcher in lowest position with side rails up. Pt is Gaurav speaking with son Augustineangela at bedside to assist in translation.

## 2024-06-24 NOTE — ED PROVIDER NOTE - ATTENDING CONTRIBUTION TO CARE
Emergency Medicine Attending MD Krishna:  patient seen and evaluated with the resident.  I was present for key portions of the History & Physical, and I agree with the Impression & Plan.    69-year-old male complaining of 5 days chest pain shortness of breath, low-grade temp.  Took Tylenol this morning.  Temperature in the ER today is 101.5     No history cardiac disease, DVT or PE.  Patient recently flew home from [place] 10 days ago; the symptoms started 5 days after he got home.  Associated symptoms: No calf pain, no lower extremity edema, no hemoptysis.    VS: + Fever appreciated, hypertension appreciated, otherwise within normal limits  Gen: Fatigued appearing adult male, otherwise no acute distress  Head: NC/AT  Neck: trachea midline  Resp:  No distress  CV: RRR, no RMG  Abd: nondistended  Ext: no deformities  Neuro:  A&Ox4 appears non focal  Skin:  Warm and dry as visualized  Psych: appropriate    Medical Decision Making / Differential Diagnosis:  Differential diagnosis time includes viral URI, acute coronary syndrome.  Suspicion for pulmonary embolism low at this time.  CTA chest not indicated at this time.    Patient is a ECG directly visualized by me and shows normal sinus rhythm, rate 89, , QRS 80, QTc 411, no ST elevations or depressions.

## 2024-06-24 NOTE — ED PROVIDER NOTE - PHYSICAL EXAMINATION
General: well appearing. not in apparent distress  Skin: warm, moist.  Eyes: normal appearing conjunctiva. PERRL, EOMI  Mouth: normal mucous membrane. no erythema or exudate.   CV: s1, s2 normal. no murmurs appreciated  Resp: Diminished breath sounds on the left lower lobe.  GI: soft, nondistended. no TTP. BS heard  Extremities: no edema General: well appearing. not in apparent distress  Skin: warm, dry  Eyes: normal appearing conjunctiva. PERRL, EOMI  Mouth: normal mucous membrane. no erythema or exudate.   CV: s1, s2 normal. no murmurs appreciated  Resp: Diminished breath sounds on the left lower lobe.  GI: soft, nondistended. no TTP. BS heard  Extremities: no edema

## 2024-06-24 NOTE — ED PROVIDER NOTE - PATIENT PORTAL LINK FT
You can access the FollowMyHealth Patient Portal offered by [facility] by registering at the following website: [email or web address]. By joining "[employer]"’s FollowMyHealth portal, you will also be able to view your health information using other applications (apps) compatible with our system.

## 2024-06-24 NOTE — ED PROVIDER NOTE - NSFOLLOWUPINSTRUCTIONS_ED_ALL_ED_FT
Shortness of breath    Shortness of breath (dyspnea) means you have trouble breathing and could indicate a medical problem. Causes include lung disease, heart disease, low amount of red blood cells (anemia), poor physical fitness, being overweight, smoking, etc. Your health care provider today may not be able to find a cause for your shortness of breath after your exam. In this case, it is important to have a follow-up exam with your primary care physician as instructed. If medicines were prescribed, take them as directed for the full length of time directed. Refrain from tobacco products.    SEEK IMMEDIATE MEDICAL CARE IF YOU HAVE ANY OF THE FOLLOWING SYMPTOMS: worsening shortness of breath, chest pain, back pain, abdominal pain, fever, coughing up blood, lightheadedness/dizziness.    Pneumonia    Pneumonia is an infection of the lungs. Pneumonia may be caused by bacteria, viruses, or funguses. Symptoms include coughing, fever, chest pain when breathing deeply or coughing, shortness of breath, fatigue, or muscle aches. Pneumonia can be diagnosed with a medical history and physical exam, as well as other tests which may include a chest X-ray. If you were prescribed an antibiotic medicine, take it as told by your health care provider and do not stop taking the antibiotic even if you start to feel better. Do not use tobacco products, including cigarettes, chewing tobacco, and e-cigarettes.    SEEK IMMEDIATE MEDICAL CARE IF YOU HAVE ANY OF THE FOLLOWING SYMPTOMS: worsening shortness of breath, worsening chest pain, coughing up blood, change in mental status, lightheadedness/dizziness.

## 2024-06-24 NOTE — ED ADULT NURSE REASSESSMENT NOTE - NS ED NURSE REASSESS COMMENT FT1
Patient currently resting comfortably w/ no complaints or requests at this time. Plan of care explained. Stretcher in lowest position with call bell within reach and side rails up. Comfort and safety provided. Repeat labs sent to lab.

## 2024-06-24 NOTE — ED PROVIDER NOTE - NS ED ROS FT
General: +fever  HENT: denies nasal congestion, rhinorrhea  Eyes: denies visual changes, blurred vision  CV: denies chest pain, palpitations  Resp: +sob, +cough  Abdominal: denies nausea, vomiting, diarrhea, abdominal pain  : denies urinary pain or discharge  MSK: denies muscle aches, leg swelling  Neuro: denies headaches, numbness, tingling  Skin: denies rashes, bruises

## 2024-06-24 NOTE — ED ADULT NURSE NOTE - NS_NURSE_DISC_TEACHING_YN_ED_ALL_ED
Voice mail left regarding needing a fax number to send her form to complete and then forward onto Trice   Yes

## 2024-06-24 NOTE — ED PROVIDER NOTE - CLINICAL SUMMARY MEDICAL DECISION MAKING FREE TEXT BOX
70 yo M with PMHx of HTN and HLD presents for SOB for 5 days. Febrile to 101.5F and hypertensive 178/90 at ED. Decreased breath sounds in left lower lobe but otherwise benign exam. Considering viral infection, pneumonia, ACS. Less likely PE. Recent travel hx, but no LE swelling, hemoptysis, or recent surgery. Less likely endocarditis, dissection, tamponade. Labs and imaging ordered. Will reassess. Dispo pending.

## 2024-06-25 VITALS
DIASTOLIC BLOOD PRESSURE: 71 MMHG | SYSTOLIC BLOOD PRESSURE: 126 MMHG | TEMPERATURE: 98 F | OXYGEN SATURATION: 95 % | RESPIRATION RATE: 18 BRPM | HEART RATE: 71 BPM

## 2024-06-25 PROBLEM — E78.5 HYPERLIPIDEMIA, UNSPECIFIED: Chronic | Status: ACTIVE | Noted: 2022-06-30

## 2024-06-25 PROBLEM — Z92.29 PERSONAL HISTORY OF OTHER DRUG THERAPY: Chronic | Status: ACTIVE | Noted: 2022-06-30

## 2024-06-25 PROBLEM — I10 ESSENTIAL (PRIMARY) HYPERTENSION: Chronic | Status: ACTIVE | Noted: 2022-06-30

## 2024-06-25 PROBLEM — U07.1 COVID-19: Chronic | Status: ACTIVE | Noted: 2022-06-30

## 2024-06-25 LAB
CULTURE RESULTS: SIGNIFICANT CHANGE UP
SPECIMEN SOURCE: SIGNIFICANT CHANGE UP
TROPONIN T, HIGH SENSITIVITY RESULT: 31 NG/L — SIGNIFICANT CHANGE UP (ref 0–51)

## 2024-06-25 PROCEDURE — 84484 ASSAY OF TROPONIN QUANT: CPT

## 2024-06-25 PROCEDURE — 84295 ASSAY OF SERUM SODIUM: CPT

## 2024-06-25 PROCEDURE — 36415 COLL VENOUS BLD VENIPUNCTURE: CPT

## 2024-06-25 PROCEDURE — 85025 COMPLETE CBC W/AUTO DIFF WBC: CPT

## 2024-06-25 PROCEDURE — 82803 BLOOD GASES ANY COMBINATION: CPT

## 2024-06-25 PROCEDURE — 71275 CT ANGIOGRAPHY CHEST: CPT | Mod: MC

## 2024-06-25 PROCEDURE — 82947 ASSAY GLUCOSE BLOOD QUANT: CPT

## 2024-06-25 PROCEDURE — 87086 URINE CULTURE/COLONY COUNT: CPT

## 2024-06-25 PROCEDURE — 81001 URINALYSIS AUTO W/SCOPE: CPT

## 2024-06-25 PROCEDURE — 85018 HEMOGLOBIN: CPT

## 2024-06-25 PROCEDURE — 87040 BLOOD CULTURE FOR BACTERIA: CPT

## 2024-06-25 PROCEDURE — 82330 ASSAY OF CALCIUM: CPT

## 2024-06-25 PROCEDURE — 83880 ASSAY OF NATRIURETIC PEPTIDE: CPT

## 2024-06-25 PROCEDURE — 85014 HEMATOCRIT: CPT

## 2024-06-25 PROCEDURE — 80053 COMPREHEN METABOLIC PANEL: CPT

## 2024-06-25 PROCEDURE — 84132 ASSAY OF SERUM POTASSIUM: CPT

## 2024-06-25 PROCEDURE — 87637 SARSCOV2&INF A&B&RSV AMP PRB: CPT

## 2024-06-25 PROCEDURE — 93005 ELECTROCARDIOGRAM TRACING: CPT

## 2024-06-25 PROCEDURE — 83605 ASSAY OF LACTIC ACID: CPT

## 2024-06-25 PROCEDURE — 71046 X-RAY EXAM CHEST 2 VIEWS: CPT

## 2024-06-25 PROCEDURE — 99285 EMERGENCY DEPT VISIT HI MDM: CPT | Mod: 25

## 2024-06-25 PROCEDURE — 82435 ASSAY OF BLOOD CHLORIDE: CPT

## 2024-06-25 RX ORDER — AZITHROMYCIN 500 MG/1
1 TABLET, FILM COATED ORAL
Qty: 4 | Refills: 0
Start: 2024-06-25 | End: 2024-06-28

## 2024-06-25 RX ORDER — AZITHROMYCIN 500 MG/1
500 TABLET, FILM COATED ORAL ONCE
Refills: 0 | Status: COMPLETED | OUTPATIENT
Start: 2024-06-25 | End: 2024-06-25

## 2024-06-25 RX ADMIN — Medication 40 MILLIEQUIVALENT(S): at 04:04

## 2024-06-25 RX ADMIN — Medication 40 MILLIEQUIVALENT(S): at 00:13

## 2024-06-25 RX ADMIN — AZITHROMYCIN 500 MILLIGRAM(S): 500 TABLET, FILM COATED ORAL at 04:12

## 2024-06-30 LAB
CULTURE RESULTS: SIGNIFICANT CHANGE UP
CULTURE RESULTS: SIGNIFICANT CHANGE UP
SPECIMEN SOURCE: SIGNIFICANT CHANGE UP
SPECIMEN SOURCE: SIGNIFICANT CHANGE UP